# Patient Record
Sex: MALE | Race: WHITE | NOT HISPANIC OR LATINO
[De-identification: names, ages, dates, MRNs, and addresses within clinical notes are randomized per-mention and may not be internally consistent; named-entity substitution may affect disease eponyms.]

---

## 2023-05-10 ENCOUNTER — NON-APPOINTMENT (OUTPATIENT)
Age: 88
End: 2023-05-10

## 2023-09-29 ENCOUNTER — INPATIENT (INPATIENT)
Facility: HOSPITAL | Age: 88
LOS: 4 days | Discharge: NURSING, REHAB OR LONG-TERM CARE | DRG: 177 | End: 2023-10-04
Attending: INTERNAL MEDICINE | Admitting: INTERNAL MEDICINE
Payer: MEDICARE

## 2023-09-29 VITALS
DIASTOLIC BLOOD PRESSURE: 60 MMHG | WEIGHT: 156.53 LBS | HEART RATE: 94 BPM | RESPIRATION RATE: 18 BRPM | OXYGEN SATURATION: 97 % | SYSTOLIC BLOOD PRESSURE: 143 MMHG

## 2023-09-29 DIAGNOSIS — R06.02 SHORTNESS OF BREATH: ICD-10-CM

## 2023-09-29 LAB
ALBUMIN SERPL ELPH-MCNC: 3 G/DL — LOW (ref 3.5–5)
ALP SERPL-CCNC: 77 U/L — SIGNIFICANT CHANGE UP (ref 40–120)
ALT FLD-CCNC: 26 U/L DA — SIGNIFICANT CHANGE UP (ref 10–60)
ANION GAP SERPL CALC-SCNC: 9 MMOL/L — SIGNIFICANT CHANGE UP (ref 5–17)
ANISOCYTOSIS BLD QL: SLIGHT — SIGNIFICANT CHANGE UP
APTT BLD: 38.6 SEC — HIGH (ref 24.5–35.6)
AST SERPL-CCNC: 37 U/L — SIGNIFICANT CHANGE UP (ref 10–40)
BASE EXCESS BLDV CALC-SCNC: -1 MMOL/L — SIGNIFICANT CHANGE UP
BASE EXCESS BLDV CALC-SCNC: -4.4 MMOL/L — SIGNIFICANT CHANGE UP
BASOPHILS # BLD AUTO: 0 K/UL — SIGNIFICANT CHANGE UP (ref 0–0.2)
BASOPHILS NFR BLD AUTO: 0 % — SIGNIFICANT CHANGE UP (ref 0–2)
BILIRUB SERPL-MCNC: 0.4 MG/DL — SIGNIFICANT CHANGE UP (ref 0.2–1.2)
BLOOD GAS COMMENTS, VENOUS: SIGNIFICANT CHANGE UP
BUN SERPL-MCNC: 23 MG/DL — HIGH (ref 7–18)
CA-I SERPL-SCNC: SIGNIFICANT CHANGE UP MMOL/L (ref 1.15–1.33)
CALCIUM SERPL-MCNC: 8.9 MG/DL — SIGNIFICANT CHANGE UP (ref 8.4–10.5)
CHLORIDE SERPL-SCNC: 106 MMOL/L — SIGNIFICANT CHANGE UP (ref 96–108)
CO2 SERPL-SCNC: 26 MMOL/L — SIGNIFICANT CHANGE UP (ref 22–31)
CREAT SERPL-MCNC: 1 MG/DL — SIGNIFICANT CHANGE UP (ref 0.5–1.3)
EGFR: 73 ML/MIN/1.73M2 — SIGNIFICANT CHANGE UP
ELLIPTOCYTES BLD QL SMEAR: SLIGHT — SIGNIFICANT CHANGE UP
EOSINOPHIL # BLD AUTO: 0 K/UL — SIGNIFICANT CHANGE UP (ref 0–0.5)
EOSINOPHIL NFR BLD AUTO: 0 % — SIGNIFICANT CHANGE UP (ref 0–6)
GAS PNL BLDV: 141 MMOL/L — SIGNIFICANT CHANGE UP (ref 136–145)
GAS PNL BLDV: SIGNIFICANT CHANGE UP
GIANT PLATELETS BLD QL SMEAR: PRESENT — SIGNIFICANT CHANGE UP
GLUCOSE SERPL-MCNC: 236 MG/DL — HIGH (ref 70–99)
HCO3 BLDV-SCNC: 23 MMOL/L — SIGNIFICANT CHANGE UP (ref 22–29)
HCO3 BLDV-SCNC: 27 MMOL/L — SIGNIFICANT CHANGE UP (ref 22–29)
HCT VFR BLD CALC: 30.9 % — LOW (ref 39–50)
HGB BLD-MCNC: 9.6 G/DL — LOW (ref 13–17)
HOROWITZ INDEX BLDV+IHG-RTO: 21 — SIGNIFICANT CHANGE UP
HPIV2 RNA SPEC QL NAA+PROBE: DETECTED
INR BLD: 1.08 RATIO — SIGNIFICANT CHANGE UP (ref 0.85–1.18)
LACTATE BLDV-MCNC: 3.9 MMOL/L — HIGH (ref 0.5–2)
LACTATE SERPL-SCNC: 3.7 MMOL/L — HIGH (ref 0.7–2)
LACTATE SERPL-SCNC: 7.3 MMOL/L — CRITICAL HIGH (ref 0.7–2)
LG PLATELETS BLD QL AUTO: SLIGHT — SIGNIFICANT CHANGE UP
LYMPHOCYTES # BLD AUTO: 0.57 K/UL — LOW (ref 1–3.3)
LYMPHOCYTES # BLD AUTO: 5 % — LOW (ref 13–44)
MACROCYTES BLD QL: SLIGHT — SIGNIFICANT CHANGE UP
MANUAL SMEAR VERIFICATION: SIGNIFICANT CHANGE UP
MCHC RBC-ENTMCNC: 31.1 GM/DL — LOW (ref 32–36)
MCHC RBC-ENTMCNC: 37.5 PG — HIGH (ref 27–34)
MCV RBC AUTO: 120.7 FL — HIGH (ref 80–100)
MONOCYTES # BLD AUTO: 1.14 K/UL — HIGH (ref 0–0.9)
MONOCYTES NFR BLD AUTO: 10 % — SIGNIFICANT CHANGE UP (ref 2–14)
MYELOCYTES NFR BLD: 2 % — HIGH (ref 0–0)
NEUTROPHILS # BLD AUTO: 9 K/UL — HIGH (ref 1.8–7.4)
NEUTROPHILS NFR BLD AUTO: 79 % — HIGH (ref 43–77)
NRBC # BLD: 0 /100 — SIGNIFICANT CHANGE UP (ref 0–0)
NT-PROBNP SERPL-SCNC: 273 PG/ML — SIGNIFICANT CHANGE UP (ref 0–450)
OVALOCYTES BLD QL SMEAR: SLIGHT — SIGNIFICANT CHANGE UP
PCO2 BLDV: 48 MMHG — SIGNIFICANT CHANGE UP (ref 42–55)
PCO2 BLDV: 57 MMHG — HIGH (ref 42–55)
PH BLDV: 7.28 — LOW (ref 7.32–7.43)
PH BLDV: 7.28 — LOW (ref 7.32–7.43)
PLAT MORPH BLD: NORMAL — SIGNIFICANT CHANGE UP
PLATELET # BLD AUTO: 121 K/UL — LOW (ref 150–400)
PLATELET COUNT - ESTIMATE: NORMAL — SIGNIFICANT CHANGE UP
PO2 BLDV: 29 MMHG — SIGNIFICANT CHANGE UP
PO2 BLDV: 51 MMHG — SIGNIFICANT CHANGE UP
POIKILOCYTOSIS BLD QL AUTO: SLIGHT — SIGNIFICANT CHANGE UP
POTASSIUM BLDV-SCNC: 4.2 MMOL/L — SIGNIFICANT CHANGE UP (ref 3.5–5.1)
POTASSIUM SERPL-MCNC: 3.8 MMOL/L — SIGNIFICANT CHANGE UP (ref 3.5–5.3)
POTASSIUM SERPL-SCNC: 3.8 MMOL/L — SIGNIFICANT CHANGE UP (ref 3.5–5.3)
PROT SERPL-MCNC: 7.9 G/DL — SIGNIFICANT CHANGE UP (ref 6–8.3)
PROTHROM AB SERPL-ACNC: 12.3 SEC — SIGNIFICANT CHANGE UP (ref 9.5–13)
RAPID RVP RESULT: DETECTED
RBC # BLD: 2.56 M/UL — LOW (ref 4.2–5.8)
RBC # FLD: 15.1 % — HIGH (ref 10.3–14.5)
RBC BLD AUTO: ABNORMAL
SAO2 % BLDV: 31.8 % — SIGNIFICANT CHANGE UP
SAO2 % BLDV: 72.7 % — SIGNIFICANT CHANGE UP
SARS-COV-2 RNA SPEC QL NAA+PROBE: SIGNIFICANT CHANGE UP
SCHISTOCYTES BLD QL AUTO: SLIGHT — SIGNIFICANT CHANGE UP
SODIUM SERPL-SCNC: 141 MMOL/L — SIGNIFICANT CHANGE UP (ref 135–145)
TROPONIN I, HIGH SENSITIVITY RESULT: 9.2 NG/L — SIGNIFICANT CHANGE UP
VARIANT LYMPHS # BLD: 4 % — SIGNIFICANT CHANGE UP (ref 0–6)
WBC # BLD: 11.39 K/UL — HIGH (ref 3.8–10.5)
WBC # FLD AUTO: 11.39 K/UL — HIGH (ref 3.8–10.5)

## 2023-09-29 PROCEDURE — 99285 EMERGENCY DEPT VISIT HI MDM: CPT

## 2023-09-29 PROCEDURE — 71045 X-RAY EXAM CHEST 1 VIEW: CPT | Mod: 26

## 2023-09-29 RX ORDER — MAGNESIUM SULFATE 500 MG/ML
1 VIAL (ML) INJECTION ONCE
Refills: 0 | Status: COMPLETED | OUTPATIENT
Start: 2023-09-29 | End: 2023-09-29

## 2023-09-29 RX ORDER — PIPERACILLIN AND TAZOBACTAM 4; .5 G/20ML; G/20ML
3.38 INJECTION, POWDER, LYOPHILIZED, FOR SOLUTION INTRAVENOUS ONCE
Refills: 0 | Status: COMPLETED | OUTPATIENT
Start: 2023-09-29 | End: 2023-09-29

## 2023-09-29 RX ORDER — IPRATROPIUM/ALBUTEROL SULFATE 18-103MCG
3 AEROSOL WITH ADAPTER (GRAM) INHALATION
Refills: 0 | Status: COMPLETED | OUTPATIENT
Start: 2023-09-29 | End: 2023-09-29

## 2023-09-29 RX ORDER — SODIUM CHLORIDE 9 MG/ML
1000 INJECTION INTRAMUSCULAR; INTRAVENOUS; SUBCUTANEOUS ONCE
Refills: 0 | Status: COMPLETED | OUTPATIENT
Start: 2023-09-29 | End: 2023-09-29

## 2023-09-29 RX ORDER — VANCOMYCIN HCL 1 G
1000 VIAL (EA) INTRAVENOUS ONCE
Refills: 0 | Status: COMPLETED | OUTPATIENT
Start: 2023-09-29 | End: 2023-09-29

## 2023-09-29 RX ADMIN — PIPERACILLIN AND TAZOBACTAM 200 GRAM(S): 4; .5 INJECTION, POWDER, LYOPHILIZED, FOR SOLUTION INTRAVENOUS at 16:40

## 2023-09-29 RX ADMIN — SODIUM CHLORIDE 1000 MILLILITER(S): 9 INJECTION INTRAMUSCULAR; INTRAVENOUS; SUBCUTANEOUS at 23:01

## 2023-09-29 RX ADMIN — Medication 3 MILLILITER(S): at 19:38

## 2023-09-29 RX ADMIN — SODIUM CHLORIDE 1000 MILLILITER(S): 9 INJECTION INTRAMUSCULAR; INTRAVENOUS; SUBCUTANEOUS at 20:27

## 2023-09-29 RX ADMIN — SODIUM CHLORIDE 1000 MILLILITER(S): 9 INJECTION INTRAMUSCULAR; INTRAVENOUS; SUBCUTANEOUS at 19:27

## 2023-09-29 RX ADMIN — Medication 3 MILLILITER(S): at 19:27

## 2023-09-29 RX ADMIN — Medication 3 MILLILITER(S): at 18:44

## 2023-09-29 RX ADMIN — Medication 250 MILLIGRAM(S): at 17:50

## 2023-09-29 RX ADMIN — Medication 100 GRAM(S): at 16:40

## 2023-09-29 NOTE — ED PROVIDER NOTE - CLINICAL SUMMARY MEDICAL DECISION MAKING FREE TEXT BOX
87-year-old male with past medical history of carotid artery stenosis, depression, high cholesterol presents with shortness of breath from acute rehab.  EMS states when they arrived at the scene patient appeared tachypneic to 30s patient was already on nonrebreather.  States nonrebreather was still started when they arrived.  Patient arrived after being on nonrebreather for about an hour.  As per EMS his respiratory status has significantly improved.  EMS also reported patient was unconscious when they got there and was able to say his name when they arrived in the ED.  Patient is not providing any history, not  answering any questions.  Spoke with Alex son who is listed as healthcare proxy in the paperwork at cell #[phone] -states few weeks ago patient was admitted at [place] for pneumonia.  Patient stated Vicon for 4 weeks and became very weak was unable to walk.  Patient was discharged to subacute rehab at that time.  States he saw the patient yesterday and he was answering all questions appropriately.  Did not have any complaints at that time. Son states he has critical carotid stenosis - and he develops distress if he is laid flat. States he will not be able to tolerate lying flat for CT and does not want him to get it. States this has been on going for years. He sleeps on a recliner at home.  Patient is chronically ill-appearing.  Not in any respiratory distress.  Equal breath sounds bilaterally.  No wheezing or rhonchi appreciated.  Patient is not following any commands. Does not answer questions. Pupils are equal and reactive to light.  Eyes open spontaneouly. Ddx include but not limited to pneumonia, UTI, sepsis. Pt might have been laid flat at subacute rehab that might have caused the distress as the son is explaining. Pt is downgraded to NC to be observed on it. 87-year-old male with past medical history of carotid artery stenosis, depression, high cholesterol presents with shortness of breath from acute rehab.  EMS states when they arrived at the scene patient appeared tachypneic to 30s patient was already on nonrebreather.  States nonrebreather was still started when they arrived.  Patient arrived after being on nonrebreather for about an hour.  As per EMS his respiratory status has significantly improved.  EMS also reported patient was unconscious when they got there and was able to say his name when they arrived in the ED.  Patient is not providing any history, not  answering any questions.  Spoke with Alex son who is listed as healthcare proxy in the paperwork at cell #[phone] -states few weeks ago patient was admitted at [place] for pneumonia.  Patient stated Vicon for 4 weeks and became very weak was unable to walk.  Patient was discharged to subacute rehab at that time.  States he saw the patient yesterday and he was answering all questions appropriately.  Did not have any complaints at that time. Son states he has critical carotid stenosis - and he develops distress if he is laid flat. States he will not be able to tolerate lying flat for CT and does not want him to get it. States this has been on going for years. He sleeps on a recliner at home.  Patient is chronically ill-appearing.  Not in any respiratory distress.  Equal breath sounds bilaterally.  No wheezing or rhonchi appreciated.  Patient is not following any commands. Does not answer questions. Pupils are equal and reactive to light.  Eyes open spontaneouly. Ddx include but not limited to pneumonia, UTI, sepsis. Pt might have been laid flat at subacute rehab that might have caused the distress as the son is explaining. Pt is downgraded to NC to be observed on it.  Patient is noted to have fluid in his airway that is suctioned with copious mucus.  Once patient is suctioned well his saturation improved now only requiring 2 L of nasal cannula.  Patient denies any pain at this time.  Son visited and states patient is at his baseline now.

## 2023-09-29 NOTE — ED PROVIDER NOTE - PROGRESS NOTE DETAILS
pt appears significantly improved since he was suctioned - abd soft non tender. Noted to have elevated lactate that is uptrending. plan to given another bolus and send a repeat lactate.

## 2023-09-30 DIAGNOSIS — Z29.9 ENCOUNTER FOR PROPHYLACTIC MEASURES, UNSPECIFIED: ICD-10-CM

## 2023-09-30 DIAGNOSIS — F32.9 MAJOR DEPRESSIVE DISORDER, SINGLE EPISODE, UNSPECIFIED: ICD-10-CM

## 2023-09-30 DIAGNOSIS — G93.41 METABOLIC ENCEPHALOPATHY: ICD-10-CM

## 2023-09-30 DIAGNOSIS — E87.20 ACIDOSIS, UNSPECIFIED: ICD-10-CM

## 2023-09-30 DIAGNOSIS — I65.29 OCCLUSION AND STENOSIS OF UNSPECIFIED CAROTID ARTERY: ICD-10-CM

## 2023-09-30 DIAGNOSIS — J69.0 PNEUMONITIS DUE TO INHALATION OF FOOD AND VOMIT: ICD-10-CM

## 2023-09-30 DIAGNOSIS — B34.8 OTHER VIRAL INFECTIONS OF UNSPECIFIED SITE: ICD-10-CM

## 2023-09-30 DIAGNOSIS — J96.01 ACUTE RESPIRATORY FAILURE WITH HYPOXIA: ICD-10-CM

## 2023-09-30 DIAGNOSIS — E78.5 HYPERLIPIDEMIA, UNSPECIFIED: ICD-10-CM

## 2023-09-30 DIAGNOSIS — I10 ESSENTIAL (PRIMARY) HYPERTENSION: ICD-10-CM

## 2023-09-30 LAB
ANION GAP SERPL CALC-SCNC: 11 MMOL/L — SIGNIFICANT CHANGE UP (ref 5–17)
ANION GAP SERPL CALC-SCNC: 4 MMOL/L — LOW (ref 5–17)
APPEARANCE UR: ABNORMAL
BACTERIA # UR AUTO: ABNORMAL /HPF
BASOPHILS # BLD AUTO: 0.07 K/UL — SIGNIFICANT CHANGE UP (ref 0–0.2)
BASOPHILS NFR BLD AUTO: 0.2 % — SIGNIFICANT CHANGE UP (ref 0–2)
BILIRUB UR-MCNC: NEGATIVE — SIGNIFICANT CHANGE UP
BUN SERPL-MCNC: 20 MG/DL — HIGH (ref 7–18)
BUN SERPL-MCNC: 23 MG/DL — HIGH (ref 7–18)
CALCIUM SERPL-MCNC: 7.9 MG/DL — LOW (ref 8.4–10.5)
CALCIUM SERPL-MCNC: 7.9 MG/DL — LOW (ref 8.4–10.5)
CHLORIDE SERPL-SCNC: 109 MMOL/L — HIGH (ref 96–108)
CHLORIDE SERPL-SCNC: 112 MMOL/L — HIGH (ref 96–108)
CHOLEST SERPL-MCNC: 142 MG/DL — SIGNIFICANT CHANGE UP
CO2 SERPL-SCNC: 23 MMOL/L — SIGNIFICANT CHANGE UP (ref 22–31)
CO2 SERPL-SCNC: 27 MMOL/L — SIGNIFICANT CHANGE UP (ref 22–31)
COLOR SPEC: YELLOW — SIGNIFICANT CHANGE UP
CREAT SERPL-MCNC: 0.76 MG/DL — SIGNIFICANT CHANGE UP (ref 0.5–1.3)
CREAT SERPL-MCNC: 1.01 MG/DL — SIGNIFICANT CHANGE UP (ref 0.5–1.3)
DIFF PNL FLD: ABNORMAL
EGFR: 72 ML/MIN/1.73M2 — SIGNIFICANT CHANGE UP
EGFR: 87 ML/MIN/1.73M2 — SIGNIFICANT CHANGE UP
EOSINOPHIL # BLD AUTO: 0 K/UL — SIGNIFICANT CHANGE UP (ref 0–0.5)
EOSINOPHIL NFR BLD AUTO: 0 % — SIGNIFICANT CHANGE UP (ref 0–6)
EPI CELLS # UR: PRESENT
GLUCOSE SERPL-MCNC: 101 MG/DL — HIGH (ref 70–99)
GLUCOSE SERPL-MCNC: 129 MG/DL — HIGH (ref 70–99)
GLUCOSE UR QL: 250 MG/DL
HCT VFR BLD CALC: 23.9 % — LOW (ref 39–50)
HCT VFR BLD CALC: 26 % — LOW (ref 39–50)
HDLC SERPL-MCNC: 67 MG/DL — SIGNIFICANT CHANGE UP
HGB BLD-MCNC: 7.4 G/DL — LOW (ref 13–17)
HGB BLD-MCNC: 8.1 G/DL — LOW (ref 13–17)
IMM GRANULOCYTES NFR BLD AUTO: 2.2 % — HIGH (ref 0–0.9)
KETONES UR-MCNC: ABNORMAL MG/DL
LACTATE SERPL-SCNC: 1.8 MMOL/L — SIGNIFICANT CHANGE UP (ref 0.7–2)
LACTATE SERPL-SCNC: 5.6 MMOL/L — CRITICAL HIGH (ref 0.7–2)
LACTATE SERPL-SCNC: 6.1 MMOL/L — CRITICAL HIGH (ref 0.7–2)
LEUKOCYTE ESTERASE UR-ACNC: ABNORMAL
LIPID PNL WITH DIRECT LDL SERPL: 67 MG/DL — SIGNIFICANT CHANGE UP
LYMPHOCYTES # BLD AUTO: 0.66 K/UL — LOW (ref 1–3.3)
LYMPHOCYTES # BLD AUTO: 1.7 % — LOW (ref 13–44)
MAGNESIUM SERPL-MCNC: 1.9 MG/DL — SIGNIFICANT CHANGE UP (ref 1.6–2.6)
MCHC RBC-ENTMCNC: 31 GM/DL — LOW (ref 32–36)
MCHC RBC-ENTMCNC: 31.2 GM/DL — LOW (ref 32–36)
MCHC RBC-ENTMCNC: 37.3 PG — HIGH (ref 27–34)
MCHC RBC-ENTMCNC: 37.8 PG — HIGH (ref 27–34)
MCV RBC AUTO: 119.8 FL — HIGH (ref 80–100)
MCV RBC AUTO: 121.9 FL — HIGH (ref 80–100)
MONOCYTES # BLD AUTO: 8.15 K/UL — HIGH (ref 0–0.9)
MONOCYTES NFR BLD AUTO: 20.7 % — HIGH (ref 2–14)
NEUTROPHILS # BLD AUTO: 29.65 K/UL — HIGH (ref 1.8–7.4)
NEUTROPHILS NFR BLD AUTO: 75.2 % — SIGNIFICANT CHANGE UP (ref 43–77)
NITRITE UR-MCNC: NEGATIVE — SIGNIFICANT CHANGE UP
NON HDL CHOLESTEROL: 75 MG/DL — SIGNIFICANT CHANGE UP
NRBC # BLD: 0 /100 WBCS — SIGNIFICANT CHANGE UP (ref 0–0)
NRBC # BLD: 0 /100 WBCS — SIGNIFICANT CHANGE UP (ref 0–0)
PH UR: 5 — SIGNIFICANT CHANGE UP (ref 5–8)
PHOSPHATE SERPL-MCNC: 2.9 MG/DL — SIGNIFICANT CHANGE UP (ref 2.5–4.5)
PLATELET # BLD AUTO: 104 K/UL — LOW (ref 150–400)
PLATELET # BLD AUTO: 92 K/UL — LOW (ref 150–400)
POTASSIUM SERPL-MCNC: 3.9 MMOL/L — SIGNIFICANT CHANGE UP (ref 3.5–5.3)
POTASSIUM SERPL-MCNC: 4 MMOL/L — SIGNIFICANT CHANGE UP (ref 3.5–5.3)
POTASSIUM SERPL-SCNC: 3.9 MMOL/L — SIGNIFICANT CHANGE UP (ref 3.5–5.3)
POTASSIUM SERPL-SCNC: 4 MMOL/L — SIGNIFICANT CHANGE UP (ref 3.5–5.3)
PROT UR-MCNC: 100 MG/DL
RBC # BLD: 1.96 M/UL — LOW (ref 4.2–5.8)
RBC # BLD: 2.17 M/UL — LOW (ref 4.2–5.8)
RBC # FLD: 15.3 % — HIGH (ref 10.3–14.5)
RBC # FLD: 15.7 % — HIGH (ref 10.3–14.5)
RBC CASTS # UR COMP ASSIST: ABNORMAL /HPF
SODIUM SERPL-SCNC: 143 MMOL/L — SIGNIFICANT CHANGE UP (ref 135–145)
SODIUM SERPL-SCNC: 143 MMOL/L — SIGNIFICANT CHANGE UP (ref 135–145)
SP GR SPEC: 1.02 — SIGNIFICANT CHANGE UP (ref 1–1.03)
TRIGL SERPL-MCNC: 42 MG/DL — SIGNIFICANT CHANGE UP
UROBILINOGEN FLD QL: 1 MG/DL — SIGNIFICANT CHANGE UP (ref 0.2–1)
WBC # BLD: 22.17 K/UL — HIGH (ref 3.8–10.5)
WBC # BLD: 39.39 K/UL — HIGH (ref 3.8–10.5)
WBC # FLD AUTO: 22.17 K/UL — HIGH (ref 3.8–10.5)
WBC # FLD AUTO: 39.39 K/UL — HIGH (ref 3.8–10.5)
WBC UR QL: 5 /HPF — SIGNIFICANT CHANGE UP (ref 0–5)

## 2023-09-30 RX ORDER — ONDANSETRON 8 MG/1
4 TABLET, FILM COATED ORAL EVERY 8 HOURS
Refills: 0 | Status: DISCONTINUED | OUTPATIENT
Start: 2023-09-30 | End: 2023-10-04

## 2023-09-30 RX ORDER — SODIUM CHLORIDE 9 MG/ML
1000 INJECTION, SOLUTION INTRAVENOUS
Refills: 0 | Status: DISCONTINUED | OUTPATIENT
Start: 2023-09-30 | End: 2023-09-30

## 2023-09-30 RX ORDER — ENOXAPARIN SODIUM 100 MG/ML
40 INJECTION SUBCUTANEOUS EVERY 24 HOURS
Refills: 0 | Status: DISCONTINUED | OUTPATIENT
Start: 2023-09-30 | End: 2023-10-01

## 2023-09-30 RX ORDER — NYSTATIN CREAM 100000 [USP'U]/G
1 CREAM TOPICAL THREE TIMES A DAY
Refills: 0 | Status: DISCONTINUED | OUTPATIENT
Start: 2023-09-30 | End: 2023-10-04

## 2023-09-30 RX ORDER — TAMSULOSIN HYDROCHLORIDE 0.4 MG/1
1 CAPSULE ORAL
Refills: 0 | DISCHARGE

## 2023-09-30 RX ORDER — TAMSULOSIN HYDROCHLORIDE 0.4 MG/1
0.4 CAPSULE ORAL AT BEDTIME
Refills: 0 | Status: DISCONTINUED | OUTPATIENT
Start: 2023-09-30 | End: 2023-10-04

## 2023-09-30 RX ORDER — PIPERACILLIN AND TAZOBACTAM 4; .5 G/20ML; G/20ML
3.38 INJECTION, POWDER, LYOPHILIZED, FOR SOLUTION INTRAVENOUS EVERY 8 HOURS
Refills: 0 | Status: DISCONTINUED | OUTPATIENT
Start: 2023-09-30 | End: 2023-10-04

## 2023-09-30 RX ORDER — AMLODIPINE BESYLATE AND BENAZEPRIL HYDROCHLORIDE 10; 20 MG/1; MG/1
1 CAPSULE ORAL
Refills: 0 | DISCHARGE

## 2023-09-30 RX ORDER — ASPIRIN/CALCIUM CARB/MAGNESIUM 324 MG
81 TABLET ORAL DAILY
Refills: 0 | Status: DISCONTINUED | OUTPATIENT
Start: 2023-09-30 | End: 2023-10-04

## 2023-09-30 RX ORDER — FLUOXETINE HCL 10 MG
10 CAPSULE ORAL DAILY
Refills: 0 | Status: DISCONTINUED | OUTPATIENT
Start: 2023-09-30 | End: 2023-10-04

## 2023-09-30 RX ORDER — FOLIC ACID 0.8 MG
1 TABLET ORAL
Refills: 0 | DISCHARGE

## 2023-09-30 RX ORDER — ACETAMINOPHEN 500 MG
650 TABLET ORAL EVERY 6 HOURS
Refills: 0 | Status: DISCONTINUED | OUTPATIENT
Start: 2023-09-30 | End: 2023-10-04

## 2023-09-30 RX ORDER — FOLIC ACID 0.8 MG
1 TABLET ORAL DAILY
Refills: 0 | Status: DISCONTINUED | OUTPATIENT
Start: 2023-09-30 | End: 2023-10-04

## 2023-09-30 RX ORDER — AMLODIPINE BESYLATE 2.5 MG/1
5 TABLET ORAL DAILY
Refills: 0 | Status: DISCONTINUED | OUTPATIENT
Start: 2023-09-30 | End: 2023-10-04

## 2023-09-30 RX ORDER — LISINOPRIL 2.5 MG/1
10 TABLET ORAL DAILY
Refills: 0 | Status: DISCONTINUED | OUTPATIENT
Start: 2023-09-30 | End: 2023-10-04

## 2023-09-30 RX ORDER — SODIUM CHLORIDE 9 MG/ML
1000 INJECTION INTRAMUSCULAR; INTRAVENOUS; SUBCUTANEOUS
Refills: 0 | Status: DISCONTINUED | OUTPATIENT
Start: 2023-09-30 | End: 2023-10-02

## 2023-09-30 RX ORDER — ASPIRIN/CALCIUM CARB/MAGNESIUM 324 MG
1 TABLET ORAL
Refills: 0 | DISCHARGE

## 2023-09-30 RX ADMIN — PIPERACILLIN AND TAZOBACTAM 25 GRAM(S): 4; .5 INJECTION, POWDER, LYOPHILIZED, FOR SOLUTION INTRAVENOUS at 13:22

## 2023-09-30 RX ADMIN — SODIUM CHLORIDE 100 MILLILITER(S): 9 INJECTION, SOLUTION INTRAVENOUS at 01:10

## 2023-09-30 RX ADMIN — SODIUM CHLORIDE 80 MILLILITER(S): 9 INJECTION INTRAMUSCULAR; INTRAVENOUS; SUBCUTANEOUS at 18:35

## 2023-09-30 RX ADMIN — Medication 1 MILLIGRAM(S): at 22:20

## 2023-09-30 RX ADMIN — TAMSULOSIN HYDROCHLORIDE 0.4 MILLIGRAM(S): 0.4 CAPSULE ORAL at 22:20

## 2023-09-30 RX ADMIN — PIPERACILLIN AND TAZOBACTAM 25 GRAM(S): 4; .5 INJECTION, POWDER, LYOPHILIZED, FOR SOLUTION INTRAVENOUS at 22:19

## 2023-09-30 RX ADMIN — ENOXAPARIN SODIUM 40 MILLIGRAM(S): 100 INJECTION SUBCUTANEOUS at 13:21

## 2023-09-30 NOTE — PHYSICAL THERAPY INITIAL EVALUATION ADULT - GENERAL OBSERVATIONS, REHAB EVAL
pt seen supine in bed with NC at 2.5L, IV line, denied any complaint of pain. pt was cooperative during the evaluation

## 2023-09-30 NOTE — PHYSICAL THERAPY INITIAL EVALUATION ADULT - PERTINENT HX OF CURRENT PROBLEM, REHAB EVAL
86 y/o Male w/ carotid artery stenosis, HTN, depression, HLD, hx TIA/CVA?, on home O2 2L NC. Pt admitted due to SOB and generalized weakness.

## 2023-09-30 NOTE — CONSULT NOTE ADULT - ASSESSMENT
Patient is a 87y old  Male who is coming from [facility], with carotid artery stenosis, HTN, depression, HLD, hx TIA/CVA?, on home O2 2L NC, now presents to the ER for evaluation of SOB. Pt is a very poor narrator, collateral obtained from chart review. As per chart review, EMS stated when they arrived at the scene patient appeared tachypneic to 30s, was already on nonrebreather, and unconscious, when pt arrived to ER he had been on NRB for approx 1 hr and was awake and able to say his name. ED spoke with the pt's son who is HCP at cell #[phone] -states few weeks ago patient was admitted at [facility] for pneumonia.  Patient reportedly started Vicon for 4 weeks and became very weak was unable to walk.  Patient was discharged to subacute rehab at that time. Son saw the patient day prior and he was answering all questions appropriately.  Did not have any complaints at that time. ON admission, he found to have no fever, but after patient was suctioned and found to have copious mucus and food chunks in airway, once he was suctioned, he was back on 2L NC which is his baseline. He has started on  Zosyn and the ID consult requested to assist with further evaluation and antibiotic management.    # Aspiration pneumonitis- s/p suctioned copious mucus and food chunks from airway in ER  # Parainfluenza viral pneumonia  # Leukocytosis    would recommend:    1. Follow up Blood cultures  2. Monitor WBC count  3. Please obtain Procalcitonin level   4. Continue Zosyn for now and Supportive care for Parainfluenza   5. Supplemental oxygenation and Bronchodilator as needed  6. Isolation as per Protocol     d/w covering NP, Nir    will follow the patient with you and make further recommendation based on the clinical course and Lab results  Thank you for the opportunity to participate in Mr. DELA CRUZ's care    Attending Attestation:    Spent more than 65 minutes on total encounter, more than 50 % of the visit was spent counseling and/or coordinating care by the Attending physician.

## 2023-09-30 NOTE — PHYSICAL THERAPY INITIAL EVALUATION ADULT - DIAGNOSIS, PT EVAL
(ICF Model) Pt. present w/deficits in Body Structures/Function (Impairments), incl: Strength, Balance, coordination leading to deficits in performing the below noted Activities: bed mobility, transfers and ambulation.

## 2023-09-30 NOTE — H&P ADULT - NSHPPHYSICALEXAM_GEN_ALL_CORE
T(C): 36.2 (09-29-23 @ 16:45), Max: 36.2 (09-29-23 @ 16:45)  HR: 84 (09-29-23 @ 19:45) (84 - 94)  BP: 143/60 (09-29-23 @ 16:04) (143/60 - 143/60)  RR: 18 (09-29-23 @ 16:04) (18 - 18)  SpO2: 97% (09-29-23 @ 16:04) (97% - 97%)    CONSTITUTIONAL: appears disheveled, no apparent distress, chronically ill appearing, temporal wasting    HEENT: normotramuatic, acephalic, PERRL and symmetric, EOMI, No conjunctival or scleral injection, non-icteric. Oral mucosa with moist membranes. No external nasal lesions; poor dentition               Neck: supple, symmetric and without tracheal deviation    RESPIRATORY: No respiratory distress, no use of accessory muscles; CTA b/l, no wheezes, rales or rhonchi, poor air mvmt    CARDIOVASCULAR: RRRR, +S1S2, no murmurs, no rubs, no gallops; no JVD; no peripheral edema  	Vascular: carotid pulse palpable, radial pulse palpable    GASTROINTESTINAL: +BS, Soft, non tender, non distended, no rebound, no guarding; No palpable masses    MUSCULOSKELETAL: No digital clubbing or cyanosis; examination of the (head/neck, spine/ribs/pelvis, RUE, LUE, RLE, LLE) without misalignment, no spinal tenderness    SKIN: No rashes or ulcers noted; no subcutaneous nodules or induration palpable, scabs on legs, legs shiny and hairless    NEUROLOGIC: sensation intact in upper and lower extremities b/l to light touch    PSYCHIATRIC: AOx1 to person only    LYMPHATIC: No cervical LAD or tenderness    GENITOURINARY: +suprapubic tenderness, no CVA tenderness

## 2023-09-30 NOTE — CONSULT NOTE ADULT - SUBJECTIVE AND OBJECTIVE BOX
Time of visit:    CHIEF COMPLAINT: Patient is a 87y old  Male who presents with a chief complaint of acute hypoxic respiratory failure (30 Sep 2023 14:46)      HPI:  This is an  87-yo man , coming from [facility], w carotid artery stenosis, HTN, depression, HLD, hx TIA/CVA?, chronic hypoxic resp failure requiring supp O2 @  2L NC, presents w SOB. Pt is a very poor narrator, collateral obtained from chart review. As per chart review, EMS stated when they arrived at the scene patient appeared tachypneic to 30s patient was already on nonrebreather, and unconscious, when pt arrived to ED he had been on NRB for approx 1 hr and was awake and able to say his name. ED spoke with the pt's son who is HCP at cell #[phone] -states few weeks ago patient was admitted at [facility] for pneumonia.  Patient reportedly started Vicon for 4 weeks and became very weak was unable to walk.  Patient was discharged to subacute rehab at that time. States he saw the patient yesterday and he was answering all questions appropriately.  Did not have any complaints at that time. Of note, son states pt develops distress if he is laid flat. States he will not be able to tolerate lying flat for CT and does not want him to get it. States this has been on going for years. Pt sleeps on a recliner at home. In the ED, pt was suctioned and found to have copious mucus and food chunks in airway, once he was suctioned, he was back on 2L NC which is his baseline.     On interview pt was able to provide very little information, denied pain, discomfort, dyspnea, dysuria.    (30 Sep 2023 00:50)   Patient seen and examined.     PAST MEDICAL & SURGICAL HISTORY:  High cholesterol      Depression      Carotid artery stenosis      CVA (cerebrovascular accident)      History of TIAs          Allergies    No Known Allergies    Intolerances        MEDICATIONS  (STANDING):  amLODIPine   Tablet 5 milliGRAM(s) Oral daily  aspirin enteric coated 81 milliGRAM(s) Oral daily  enoxaparin Injectable 40 milliGRAM(s) SubCutaneous every 24 hours  FLUoxetine 10 milliGRAM(s) Oral daily  folic acid 1 milliGRAM(s) Oral daily  lactated ringers. 1000 milliLiter(s) (100 mL/Hr) IV Continuous <Continuous>  lisinopril 10 milliGRAM(s) Oral daily  LORazepam     Tablet 1 milliGRAM(s) Oral at bedtime  multivitamin 1 Tablet(s) Oral daily  piperacillin/tazobactam IVPB.. 3.375 Gram(s) IV Intermittent every 8 hours  tamsulosin 0.4 milliGRAM(s) Oral at bedtime      MEDICATIONS  (PRN):  acetaminophen     Tablet .. 650 milliGRAM(s) Oral every 6 hours PRN Temp greater or equal to 38C (100.4F), Mild Pain (1 - 3)  guaiFENesin Oral Liquid (Sugar-Free) 100 milliGRAM(s) Oral every 6 hours PRN Cough  ondansetron Injectable 4 milliGRAM(s) IV Push every 8 hours PRN Nausea and/or Vomiting   Medications up to date at time of exam.    Medications up to date at time of exam.    FAMILY HISTORY:      SOCIAL HISTORY   Smoking History: [   ] smoking/smoke exposure, [   ] former smoker  Living Condition: [   ] apartment, [   ] private house  Work History:   Travel History: denies recent travel  Illicit Substance Use: denies  Alcohol Use: denies    REVIEW OF SYSTEMS:    CONSTITUTIONAL:  denies fevers, chills, sweats, weight loss    HEENT:  denies diplopia or blurred vision, sore throat or runny nose.    CARDIOVASCULAR:  denies pressure, squeezing, tightness, or heaviness about the chest; no palpitations.    RESPIRATORY:  denies SOB, cough, GAYTAN, wheezing.    GASTROINTESTINAL:  denies abdominal pain, nausea, vomiting or diarrhea.    GENITOURINARY: denies dysuria, frequency or urgency.    NEUROLOGIC:  denies numbness, tingling, seizures or weakness.    PSYCHIATRIC:  denies disorder of thought or mood.    MSK: denies swelling, redness      PHYSICAL EXAMINATION:    GENERAL: The patient is a well-developed, well-nourished, in no apparent distress.     Vital Signs Last 24 Hrs  T(C): 37.1 (30 Sep 2023 13:40), Max: 37.1 (30 Sep 2023 13:40)  T(F): 98.8 (30 Sep 2023 13:40), Max: 98.8 (30 Sep 2023 13:40)  HR: 85 (30 Sep 2023 14:05) (80 - 99)  BP: 143/67 (30 Sep 2023 14:05) (123/54 - 143/67)  BP(mean): --  RR: 20 (30 Sep 2023 13:40) (18 - 22)  SpO2: 99% (30 Sep 2023 14:05) (98% - 100%)    Parameters below as of 30 Sep 2023 14:05  Patient On (Oxygen Delivery Method): nasal cannula  O2 Flow (L/min): 2.5     (if applicable)    Chest Tube (if applicable)    HEENT: Head is normocephalic and atraumatic. Extraocular muscles are intact. Mucous membranes are moist.     NECK: Supple, no palpable adenopathy.    LUNGS: Clear to auscultation, no wheezing, rales, or rhonchi.    HEART: Regular rate and rhythm without murmur.    ABDOMEN: Soft, nontender, and nondistended.  No hepatosplenomegaly is noted.    RENAL: No difficulty voiding, no pelvic pain    EXTREMITIES: Without any cyanosis, clubbing, rash, lesions or edema.    NEUROLOGIC: Awake, alert, oriented, grossly intact    SKIN: Warm, dry, good turgor.      LABS:                        8.1    39.39 )-----------( 104      ( 30 Sep 2023 04:50 )             26.0     09-30    143  |  109<H>  |  20<H>  ----------------------------<  129<H>  4.0   |  23  |  1.01    Ca    7.9<L>      30 Sep 2023 04:50  Phos  2.9     09-30  Mg     1.9     09-30    TPro  7.9  /  Alb  3.0<L>  /  TBili  0.4  /  DBili  x   /  AST  37  /  ALT  26  /  AlkPhos  77  09-29    PT/INR - ( 29 Sep 2023 16:15 )   PT: 12.3 sec;   INR: 1.08 ratio         PTT - ( 29 Sep 2023 16:15 )  PTT:38.6 sec  Urinalysis Basic - ( 30 Sep 2023 04:50 )    Color: x / Appearance: x / SG: x / pH: x  Gluc: 129 mg/dL / Ketone: x  / Bili: x / Urobili: x   Blood: x / Protein: x / Nitrite: x   Leuk Esterase: x / RBC: x / WBC x   Sq Epi: x / Non Sq Epi: x / Bacteria: x                Lactate, Blood: 5.6 mmol/L (09-30-23 @ 04:10)  Lactate, Blood: 6.1 mmol/L (09-30-23 @ 01:05)  Lactate, Blood: 7.3 mmol/L (09-29-23 @ 21:48)        MICROBIOLOGY: (if applicable)    RADIOLOGY & ADDITIONAL STUDIES:  EKG:   CXR:  ECHO:    IMPRESSION: 87y Male PAST MEDICAL & SURGICAL HISTORY:  High cholesterol      Depression      Carotid artery stenosis      CVA (cerebrovascular accident)      History of TIAs       p/w                   RECOMMENDATIONS:   Time of visit:    CHIEF COMPLAINT: Patient is a 87y old  Male who presents with a chief complaint of acute hypoxic respiratory failure (30 Sep 2023 14:46)      HPI:  This is an  87-yo man , coming from [facility], w carotid artery stenosis, HTN, depression, HLD, hx TIA/CVA?, chronic hypoxic resp failure requiring supp O2 @  2L NC, presents w SOB. Pt is a very poor narrator, collateral obtained from chart review. As per chart review, EMS stated when they arrived at the scene patient appeared tachypneic to 30s patient was already on nonrebreather, and unconscious, when pt arrived to ED he had been on NRB for approx 1 hr and was awake and able to say his name. ED spoke with the pt's son who is HCP at cell #[phone] -states few weeks ago patient was admitted at [facility] for pneumonia.  Patient reportedly started Vicon for 4 weeks and became very weak was unable to walk.  Patient was discharged to subacute rehab at that time. States he saw the patient yesterday and he was answering all questions appropriately.  Did not have any complaints at that time. Of note, son states pt develops distress if he is laid flat. States he will not be able to tolerate lying flat for CT and does not want him to get it. States this has been on going for years. Pt sleeps on a recliner at home. In the ED, pt was suctioned and found to have copious mucus and food chunks in airway, once he was suctioned, he was back on 2L NC which is his baseline.     On interview pt was able to provide very little information, denied pain, discomfort, dyspnea, dysuria.    (30 Sep 2023 00:50)   Patient seen and examined.     PAST MEDICAL & SURGICAL HISTORY:  High cholesterol      Depression      Carotid artery stenosis      CVA (cerebrovascular accident)      History of TIAs          Allergies    No Known Allergies    Intolerances        MEDICATIONS  (STANDING):  amLODIPine   Tablet 5 milliGRAM(s) Oral daily  aspirin enteric coated 81 milliGRAM(s) Oral daily  enoxaparin Injectable 40 milliGRAM(s) SubCutaneous every 24 hours  FLUoxetine 10 milliGRAM(s) Oral daily  folic acid 1 milliGRAM(s) Oral daily  lactated ringers. 1000 milliLiter(s) (100 mL/Hr) IV Continuous <Continuous>  lisinopril 10 milliGRAM(s) Oral daily  LORazepam     Tablet 1 milliGRAM(s) Oral at bedtime  multivitamin 1 Tablet(s) Oral daily  piperacillin/tazobactam IVPB.. 3.375 Gram(s) IV Intermittent every 8 hours  tamsulosin 0.4 milliGRAM(s) Oral at bedtime      MEDICATIONS  (PRN):  acetaminophen     Tablet .. 650 milliGRAM(s) Oral every 6 hours PRN Temp greater or equal to 38C (100.4F), Mild Pain (1 - 3)  guaiFENesin Oral Liquid (Sugar-Free) 100 milliGRAM(s) Oral every 6 hours PRN Cough  ondansetron Injectable 4 milliGRAM(s) IV Push every 8 hours PRN Nausea and/or Vomiting   Medications up to date at time of exam.    Medications up to date at time of exam.    FAMILY HISTORY:      SOCIAL HISTORY   Smoking History: [ x  ]  none smoking/smoke exposure, [   ] former smoker  Living Condition: [   ] apartment, [   ] private house  Work History: retired     Travel History: denies recent travel  Illicit Substance Use: denies  Alcohol Use: denies    REVIEW OF SYSTEMS: pat is unsure of answers     CONSTITUTIONAL:  denies fevers, chills, sweats, weight loss    HEENT:  denies diplopia or blurred vision, sore throat or runny nose.    CARDIOVASCULAR:  denies pressure, squeezing, tightness, or heaviness about the chest; no palpitations.    RESPIRATORY:  denies SOB, cough, GAYTAN, wheezing.    GASTROINTESTINAL:  denies abdominal pain, nausea, vomiting or diarrhea.    GENITOURINARY: denies dysuria, frequency or urgency.    NEUROLOGIC:  denies numbness, tingling, seizures or weakness.    PSYCHIATRIC:  denies disorder of thought or mood.    MSK: denies swelling, redness      PHYSICAL EXAMINATION:    GENERAL: The patient is a well-developed, well-nourished, in no apparent distress.     Vital Signs Last 24 Hrs  T(C): 37.1 (30 Sep 2023 13:40), Max: 37.1 (30 Sep 2023 13:40)  T(F): 98.8 (30 Sep 2023 13:40), Max: 98.8 (30 Sep 2023 13:40)  HR: 85 (30 Sep 2023 14:05) (80 - 99)  BP: 143/67 (30 Sep 2023 14:05) (123/54 - 143/67)  BP(mean): --  RR: 20 (30 Sep 2023 13:40) (18 - 22)  SpO2: 99% (30 Sep 2023 14:05) (98% - 100%)    Parameters below as of 30 Sep 2023 14:05  Patient On (Oxygen Delivery Method): nasal cannula  O2 Flow (L/min): 2.5     (if applicable)    Chest Tube (if applicable)    HEENT: Head is normocephalic and atraumatic. Extraocular muscles are intact. Mucous membranes are moist.     NECK: Supple, no palpable adenopathy.    LUNGS: Clear to auscultation, no wheezing, rales, or rhonchi.    HEART: Regular rate and rhythm without murmur.    ABDOMEN: Soft, nontender, and nondistended.  No hepatosplenomegaly is noted.    RENAL: No difficulty voiding, no pelvic pain    EXTREMITIES: Without any cyanosis, clubbing, rash, lesions or edema.    NEUROLOGIC: Awake, and respond to simple questions     SKIN: Warm, dry, good turgor.      LABS:                        8.1    39.39 )-----------( 104      ( 30 Sep 2023 04:50 )             26.0     09-30    143  |  109<H>  |  20<H>  ----------------------------<  129<H>  4.0   |  23  |  1.01    Ca    7.9<L>      30 Sep 2023 04:50  Phos  2.9     09-30  Mg     1.9     09-30    TPro  7.9  /  Alb  3.0<L>  /  TBili  0.4  /  DBili  x   /  AST  37  /  ALT  26  /  AlkPhos  77  09-29    PT/INR - ( 29 Sep 2023 16:15 )   PT: 12.3 sec;   INR: 1.08 ratio         PTT - ( 29 Sep 2023 16:15 )  PTT:38.6 sec  Urinalysis Basic - ( 30 Sep 2023 04:50 )    Color: x / Appearance: x / SG: x / pH: x  Gluc: 129 mg/dL / Ketone: x  / Bili: x / Urobili: x   Blood: x / Protein: x / Nitrite: x   Leuk Esterase: x / RBC: x / WBC x   Sq Epi: x / Non Sq Epi: x / Bacteria: x                Lactate, Blood: 5.6 mmol/L (09-30-23 @ 04:10)  Lactate, Blood: 6.1 mmol/L (09-30-23 @ 01:05)  Lactate, Blood: 7.3 mmol/L (09-29-23 @ 21:48)        MICROBIOLOGY: (if applicable)    RADIOLOGY & ADDITIONAL STUDIES:  EKG:   CXR:< from: Xray Chest 1 View- PORTABLE-Urgent (Xray Chest 1 View- PORTABLE-Urgent .) (09.29.23 @ 16:24) >    ACC: [id number] EXAM:  XR CHEST PORTABLE URGENT 1V   ORDERED BY: ROX MELENDREZ     PROCEDURE DATE:  09/29/2023          INTERPRETATION:  Chest portable    CLINICAL HISTORY: Evaluate for pneumonia    Comparison:  4/2/2014    Projection limits evaluation of the heart size. Mediastinal configuration   grossly unremarkable. Elevation of the right diaphragm with adjacent   platelike atelectasis/fibrosis. Lungs reveal patchy left   retrocardiac/left basilar infiltrates. Angles reveal minimal blunting   perhaps trace effusion. Bones without acute abnormality.    IMPRESSION: Left lower lobe infiltrate/atelectasis/effusion for which   follow-up radiographs are suggested.    --- End of Report ---            DOUGLAS SORIANO MD; Attending Radiologist  This document has been electronically signed. Sep 30 2023  9:59AM    < end of copied text >    Parainfluenza 2 (RapRVP): Detected (09.29.23 @ 16:15)      ECHO:    IMPRESSION: 87y Male PAST MEDICAL & SURGICAL HISTORY:  High cholesterol      Depression      Carotid artery stenosis      CVA (cerebrovascular accident)      History of TIAs       p/w           IMP: This is an  87-yr  man non smoker , coming from [facility], w carotid artery stenosis, HTN, depression, HLD, hx TIA/CVA?, chronic hypoxic resp failure requiring supp O2 @  2L NC, presents with  SOB and hypoxic resp failure .  Pt is a very poor historian , collateral obtained from chart review. As per chart review, EMS stated when they arrived at the scene patient appeared tachypneic to 30s patient was already on nonrebreather, and unconscious, when pt arrived to ED he had been on NRB for approx 1 hr and was awake and able to say his name. In the ED, pt was suctioned and found to have copious mucus and food chunks in airway, once he was suctioned, he was back on 2L NC which is his baseline.  Pat is on droplet isolation for parainfluenza infection . Lactate elevated s/p 2 L IVF      ASSESSMENT     - Acute on chronic hypoxic resp failure   - Asp PNA , UTI   - Parainfluenza infection   - CVA vs TIA   - HTN HLD   - Carotid stenosis   - Elevated lactate     Sugg  - Continue O2 supp as needed to maintain sat >90%   - Agreed with Zosyn   - F/U cultures   - Duonebs q6h   - Asp precaution   - S/S eval   - Repeat lactate , if still elevated , ICU eval   - Isolation: droplet   - DVT GI prophy   - Son refused CT chest as per EMR       spoke with Diego IBARRA and message sent to Dr Hernandez

## 2023-09-30 NOTE — H&P ADULT - HISTORY OF PRESENT ILLNESS
Pt is an 87-yo M, coming from [facility], w carotid artery stenosis, HTN, depression, HLD, hx TIA/CVA?, on home O2 2L NC, presents w SOB. Pt is a very poor narrator, collateral obtained from chart review. As per chart review, EMS stated when they arrived at the scene patient appeared tachypneic to 30s patient was already on nonrebreather, and unconscious, when pt arrived to ED he had been on NRB for approx 1 hr and was awake and able to say his name. ED spoke with the pt's son who is HCP at cell #[phone] -states few weeks ago patient was admitted at [facility] for pneumonia.  Patient reportedly started Vicon for 4 weeks and became very weak was unable to walk.  Patient was discharged to subacute rehab at that time. States he saw the patient yesterday and he was answering all questions appropriately.  Did not have any complaints at that time. Of note, son states pt develops distress if he is laid flat. States he will not be able to tolerate lying flat for CT and does not want him to get it. States this has been on going for years. Pt sleeps on a recliner at home. In the ED, pt was suctioned and found to have copious mucus and food chunks in airway, once he was suctioned, he was back on 2L NC which is his baseline.     On interview pt was able to provide very little information, denied pain, discomfort, dyspnea, dysuria.

## 2023-09-30 NOTE — PATIENT PROFILE ADULT - FALL HARM RISK - HARM RISK INTERVENTIONS
Assistance with ambulation/Assistance OOB with selected safe patient handling equipment/Communicate Risk of Fall with Harm to all staff/Monitor for mental status changes/Monitor gait and stability/Reinforce activity limits and safety measures with patient and family/Reorient to person, place and time as needed/Review medications for side effects contributing to fall risk/Sit up slowly, dangle for a short time, stand at bedside before walking/Tailored Fall Risk Interventions/Use of alarms - bed, chair and/or voice tab/Visual Cue: Yellow wristband and red socks/Bed in lowest position, wheels locked, appropriate side rails in place/Call bell, personal items and telephone in reach/Instruct patient to call for assistance before getting out of bed or chair/Non-slip footwear when patient is out of bed/[place] to call system/Physically safe environment - no spills, clutter or unnecessary equipment/Purposeful Proactive Rounding/Room/bathroom lighting operational, light cord in reach

## 2023-09-30 NOTE — PHYSICAL THERAPY INITIAL EVALUATION ADULT - IMPAIRMENTS FOUND, PT EVAL
aerobic capacity/endurance/cognitive impairment/gait, locomotion, and balance/muscle strength/poor safety awareness/ROM

## 2023-09-30 NOTE — H&P ADULT - PROBLEM SELECTOR PLAN 1
On admission was unconscious and req NRB, after aggressive suctioning which removed food chunks and copious secretions  2/2 aspiration, and pt had recently been admitted to [facility] for aspiration pneumonia  CXR wet read - No conslidations  s/p vanc + zosyn in ED    -given prior hx of aspiration pneumonia, very likely that pt will continue to have aspiration events  -elevate head of bed to 60-90 deg, do not lay patient flat  -NPO except meds pending s/s eval  -zosyn for now given hx of recent abx use, hospitalization, and stay in [facility]  -f/u bcx, ucx  -ID  _____ On admission was unconscious and req NRB, after aggressive suctioning which removed food chunks and copious secretions  2/2 aspiration, and pt had recently been admitted to [facility] for aspiration pneumonia  CXR wet read - No consolidations  Not meeting sepsis criteria  s/p vanc + zosyn in ED    -given prior hx of aspiration pneumonia, very likely that pt will continue to have aspiration events  -elevate head of bed to 60-90 deg, do not lay patient flat  -NPO except meds pending s/s eval  -zosyn for now given hx of recent abx use, hospitalization, and stay in [facility]  -f/u bcx, ucx  -ID  _____ On admission was unconscious and req NRB, after aggressive suctioning which removed food chunks and copious secretions  2/2 aspiration, and pt had recently been admitted to [facility] for aspiration pneumonia  CXR wet read - No consolidations  Not meeting sepsis criteria  s/p vanc + zosyn in ED    -given prior hx of aspiration pneumonia, very likely that pt will continue to have aspiration events  -elevate head of bed to 60-90 deg, do not lay patient flat  -NPO except meds pending s/s eval  -zosyn for now given hx of recent abx use, hospitalization, and stay in [facility]  -f/u bcx, ucx  -ID Dr. Zaman

## 2023-09-30 NOTE — H&P ADULT - PROBLEM SELECTOR PLAN 2
lactate 3.7 >> 7.3>>6.1  s/p 2L NS bolus in ED  1L LR at 100 cc/hr    -trend lactate, hydrate aggressively

## 2023-09-30 NOTE — CONSULT NOTE ADULT - SUBJECTIVE AND OBJECTIVE BOX
Patient is a 87y old  Male who is coming from [facility], with carotid artery stenosis, HTN, depression, HLD, hx TIA/CVA?, on home O2 2L NC, now presents to the ER for evaluation of SOB. Pt is a very poor narrator, collateral obtained from chart review. As per chart review, EMS stated when they arrived at the scene patient appeared tachypneic to 30s, was already on nonrebreather, and unconscious, when pt arrived to ER he had been on NRB for approx 1 hr and was awake and able to say his name. ED spoke with the pt's son who is HCP at cell #[phone] -states few weeks ago patient was admitted at [facility] for pneumonia.  Patient reportedly started Vicon for 4 weeks and became very weak was unable to walk.  Patient was discharged to subacute rehab at that time. Son saw the patient day prior and he was answering all questions appropriately.  Did not have any complaints at that time. ON admission, he found to have no fever, but after patient was suctioned and found to have copious mucus and food chunks in airway, once he was suctioned, he was back on 2L NC which is his baseline.         REVIEW OF SYSTEMS: Total of twelve systems have been reviewed with patient and found to be negative unless mentioned in HPI        PAST MEDICAL & SURGICAL HISTORY:  High cholesterol  Depression  Carotid artery stenosis  CVA (cerebrovascular accident)  History of TIAs        SOCIAL HISTORY  Alcohol: Does not drink  Tobacco: Does not smoke  Illicit substance use: None      FAMILY HISTORY: Non contributory to the present illness        ALLERGIES: No Known Allergies        Vital Signs Last 24 Hrs  T(C): 37.1 (30 Sep 2023 13:40), Max: 37.1 (30 Sep 2023 13:40)  T(F): 98.8 (30 Sep 2023 13:40), Max: 98.8 (30 Sep 2023 13:40)  HR: 82 (30 Sep 2023 13:40) (80 - 99)  BP: 131/56 (30 Sep 2023 13:40) (123/54 - 143/60)  BP(mean): --  RR: 20 (30 Sep 2023 13:40) (18 - 22)  SpO2: 98% (30 Sep 2023 13:40) (97% - 100%)    Parameters below as of 30 Sep 2023 13:40  Patient On (Oxygen Delivery Method): nasal cannula  O2 Flow (L/min): 3      PHYSICAL EXAM:  GENERAL: Not in distress   CHEST/LUNG:  Aire ntry bilaterally  HEART: s1 and s2 present  ABDOMEN:  Nontender and  Nondistended  EXTREMITIES: No pedal  edema  CNS: Awake and Alert      LABS:                        8.1    39.39 )-----------( 104      ( 30 Sep 2023 04:50 )             26.0       09-30    143  |  109<H>  |  20<H>  ----------------------------<  129<H>  4.0   |  23  |  1.01    Ca    7.9<L>      30 Sep 2023 04:50  Phos  2.9     09-30  Mg     1.9     09-30    TPro  7.9  /  Alb  3.0<L>  /  TBili  0.4  /  DBili  x   /  AST  37  /  ALT  26  /  AlkPhos  77  09-29    PT/INR - ( 29 Sep 2023 16:15 )   PT: 12.3 sec;   INR: 1.08 ratio    PTT - ( 29 Sep 2023 16:15 )  PTT:38.6 sec      CAPILLARY BLOOD GLUCOSE  POCT Blood Glucose.: 203 mg/dL (29 Sep 2023 16:03)        Urinalysis Basic - ( 30 Sep 2023 04:50 )  Color: x / Appearance: x / SG: x / pH: x  Gluc: 129 mg/dL / Ketone: x  / Bili: x / Urobili: x   Blood: x / Protein: x / Nitrite: x   Leuk Esterase: x / RBC: x / WBC x   Sq Epi: x / Non Sq Epi: x / Bacteria: x        MEDICATIONS  (STANDING):  amLODIPine   Tablet 5 milliGRAM(s) Oral daily  aspirin enteric coated 81 milliGRAM(s) Oral daily  enoxaparin Injectable 40 milliGRAM(s) SubCutaneous every 24 hours  FLUoxetine 10 milliGRAM(s) Oral daily  folic acid 1 milliGRAM(s) Oral daily  lactated ringers. 1000 milliLiter(s) (100 mL/Hr) IV Continuous <Continuous>  lisinopril 10 milliGRAM(s) Oral daily  LORazepam     Tablet 1 milliGRAM(s) Oral at bedtime  multivitamin 1 Tablet(s) Oral daily  piperacillin/tazobactam IVPB.. 3.375 Gram(s) IV Intermittent every 8 hours  tamsulosin 0.4 milliGRAM(s) Oral at bedtime    MEDICATIONS  (PRN):  acetaminophen     Tablet .. 650 milliGRAM(s) Oral every 6 hours PRN Temp greater or equal to 38C (100.4F), Mild Pain (1 - 3)  guaiFENesin Oral Liquid (Sugar-Free) 100 milliGRAM(s) Oral every 6 hours PRN Cough  ondansetron Injectable 4 milliGRAM(s) IV Push every 8 hours PRN Nausea and/or Vomiting        RADIOLOGY & ADDITIONAL TESTS:    9/29/23: Xray Chest 1 View- PORTABLE-Urgent (Xray Chest 1 View- PORTABLE-Urgent .) (09.29.23 @ 16:24) >    IMPRESSION: Left lower lobe infiltrate/atelectasis/effusion for which  follow-up radiographs are suggested.      MICROBIOLOGY DATA:    Respiratory Viral Panel with COVID-19 by RILEY (09.29.23 @ 16:15)   Rapid RVP Result: Detected  SARS-CoV-2: NotDetec: This              Patient is a 87y old  Male who is coming from [facility], with carotid artery stenosis, HTN, depression, HLD, hx TIA/CVA?, on home O2 2L NC, now presents to the ER for evaluation of SOB. Pt is a very poor narrator, collateral obtained from chart review. As per chart review, EMS stated when they arrived at the scene patient appeared tachypneic to 30s, was already on nonrebreather, and unconscious, when pt arrived to ER he had been on NRB for approx 1 hr and was awake and able to say his name. ED spoke with the pt's son who is HCP at cell #[phone] -states few weeks ago patient was admitted at [facility] for pneumonia.  Patient reportedly started Vicon for 4 weeks and became very weak was unable to walk.  Patient was discharged to subacute rehab at that time. Son saw the patient day prior and he was answering all questions appropriately.  Did not have any complaints at that time. ON admission, he found to have no fever, but after patient was suctioned and found to have copious mucus and food chunks in airway, once he was suctioned, he was back on 2L NC which is his baseline. He has started on  Zosyn and the ID consult requested to assist with further evaluation and antibiotic management.      REVIEW OF SYSTEMS: Total of twelve systems have been reviewed with patient and found to be negative unless mentioned in HPI      PAST MEDICAL & SURGICAL HISTORY:  High cholesterol  Depression  Carotid artery stenosis  CVA (cerebrovascular accident)  History of TIAs      SOCIAL HISTORY  Alcohol: Does not drink  Tobacco: Does not smoke  Illicit substance use: None      FAMILY HISTORY: Non contributory to the present illness      ALLERGIES: No Known Allergies      Vital Signs Last 24 Hrs  T(C): 37.1 (30 Sep 2023 13:40), Max: 37.1 (30 Sep 2023 13:40)  T(F): 98.8 (30 Sep 2023 13:40), Max: 98.8 (30 Sep 2023 13:40)  HR: 82 (30 Sep 2023 13:40) (80 - 99)  BP: 131/56 (30 Sep 2023 13:40) (123/54 - 143/60)  BP(mean): --  RR: 20 (30 Sep 2023 13:40) (18 - 22)  SpO2: 98% (30 Sep 2023 13:40) (97% - 100%)    Parameters below as of 30 Sep 2023 13:40  Patient On (Oxygen Delivery Method): nasal cannula  O2 Flow (L/min): 3      PHYSICAL EXAM:  GENERAL: Not in distress , on oxygen via NC  CHEST/LUNG: Not using accessory muscles  HEART: s1 and s2 present  ABDOMEN:  Nontender and  Nondistended  EXTREMITIES: No pedal  edema  CNS: Awake and Alert      LABS:                        8.1    39.39 )-----------( 104      ( 30 Sep 2023 04:50 )             26.0       09-30    143  |  109<H>  |  20<H>  ----------------------------<  129<H>  4.0   |  23  |  1.01    Ca    7.9<L>      30 Sep 2023 04:50  Phos  2.9     09-30  Mg     1.9     09-30    TPro  7.9  /  Alb  3.0<L>  /  TBili  0.4  /  DBili  x   /  AST  37  /  ALT  26  /  AlkPhos  77  09-29    PT/INR - ( 29 Sep 2023 16:15 )   PT: 12.3 sec;   INR: 1.08 ratio    PTT - ( 29 Sep 2023 16:15 )  PTT:38.6 sec      CAPILLARY BLOOD GLUCOSE  POCT Blood Glucose.: 203 mg/dL (29 Sep 2023 16:03)        Urinalysis Basic - ( 30 Sep 2023 04:50 )  Color: x / Appearance: x / SG: x / pH: x  Gluc: 129 mg/dL / Ketone: x  / Bili: x / Urobili: x   Blood: x / Protein: x / Nitrite: x   Leuk Esterase: x / RBC: x / WBC x   Sq Epi: x / Non Sq Epi: x / Bacteria: x        MEDICATIONS  (STANDING):  amLODIPine   Tablet 5 milliGRAM(s) Oral daily  aspirin enteric coated 81 milliGRAM(s) Oral daily  enoxaparin Injectable 40 milliGRAM(s) SubCutaneous every 24 hours  FLUoxetine 10 milliGRAM(s) Oral daily  folic acid 1 milliGRAM(s) Oral daily  lactated ringers. 1000 milliLiter(s) (100 mL/Hr) IV Continuous <Continuous>  lisinopril 10 milliGRAM(s) Oral daily  LORazepam     Tablet 1 milliGRAM(s) Oral at bedtime  multivitamin 1 Tablet(s) Oral daily  piperacillin/tazobactam IVPB.. 3.375 Gram(s) IV Intermittent every 8 hours  tamsulosin 0.4 milliGRAM(s) Oral at bedtime    MEDICATIONS  (PRN):  acetaminophen     Tablet .. 650 milliGRAM(s) Oral every 6 hours PRN Temp greater or equal to 38C (100.4F), Mild Pain (1 - 3)  guaiFENesin Oral Liquid (Sugar-Free) 100 milliGRAM(s) Oral every 6 hours PRN Cough  ondansetron Injectable 4 milliGRAM(s) IV Push every 8 hours PRN Nausea and/or Vomiting        RADIOLOGY & ADDITIONAL TESTS:    9/29/23: Xray Chest 1 View- PORTABLE-Urgent (Xray Chest 1 View- PORTABLE-Urgent .) (09.29.23 @ 16:24) >    IMPRESSION: Left lower lobe infiltrate/atelectasis/effusion for which  follow-up radiographs are suggested.      MICROBIOLOGY DATA:    Respiratory Viral Panel with COVID-19 by RILEY (09.29.23 @ 16:15)   Rapid RVP Result: Detected  SARS-CoV-2: NotDetec: This

## 2023-09-30 NOTE — PHYSICAL THERAPY INITIAL EVALUATION ADULT - LEVEL OF INDEPENDENCE: BED TO CHAIR, REHAB EVAL
due to weakness/unable to perform Abdomen soft, nontender, nondistended, bowel sounds present in all 4 quadrants.

## 2023-09-30 NOTE — H&P ADULT - PROBLEM SELECTOR PLAN 3
mentation appears to be improving, initially unconscious, then awake but not answering questions. On interview pt was oriented to only self but was able to answer simple questions    -likely 2/2 aspiration and parainfluenza

## 2023-09-30 NOTE — H&P ADULT - ATTENDING COMMENTS
discussed management plan with house staff  pt was evalauted by writer earlier during the day  pulm eval

## 2023-09-30 NOTE — H&P ADULT - NSICDXPASTMEDICALHX_GEN_ALL_CORE_FT
PAST MEDICAL HISTORY:  Carotid artery stenosis     CVA (cerebrovascular accident)     Depression     High cholesterol     History of TIAs

## 2023-09-30 NOTE — H&P ADULT - PROBLEM SELECTOR PLAN 7
unk if pt follows w vascular o/p  Family refused CT scan    -carotid doppler  -vascular consulted unk if pt follows w vascular o/p  Family refused CT scan    -carotid doppler

## 2023-09-30 NOTE — H&P ADULT - ASSESSMENT
87-yo M, coming from [facility], w carotid artery stenosis, HTN, depression, HLD, hx TIA/CVA?, on home O2 2L NC, admitted for aspiration pneumonia resulting in AHRF, lactic acidosis, and encephalopathy, also found to be parainfluenza pos.

## 2023-10-01 LAB
ABO RH CONFIRMATION: SIGNIFICANT CHANGE UP
ANION GAP SERPL CALC-SCNC: 4 MMOL/L — LOW (ref 5–17)
BASOPHILS # BLD AUTO: 0.03 K/UL — SIGNIFICANT CHANGE UP (ref 0–0.2)
BASOPHILS # BLD AUTO: 0.04 K/UL — SIGNIFICANT CHANGE UP (ref 0–0.2)
BASOPHILS NFR BLD AUTO: 0.3 % — SIGNIFICANT CHANGE UP (ref 0–2)
BASOPHILS NFR BLD AUTO: 0.4 % — SIGNIFICANT CHANGE UP (ref 0–2)
BLD GP AB SCN SERPL QL: SIGNIFICANT CHANGE UP
BUN SERPL-MCNC: 20 MG/DL — HIGH (ref 7–18)
CALCIUM SERPL-MCNC: 7.7 MG/DL — LOW (ref 8.4–10.5)
CHLORIDE SERPL-SCNC: 112 MMOL/L — HIGH (ref 96–108)
CO2 SERPL-SCNC: 26 MMOL/L — SIGNIFICANT CHANGE UP (ref 22–31)
CREAT SERPL-MCNC: 0.81 MG/DL — SIGNIFICANT CHANGE UP (ref 0.5–1.3)
CULTURE RESULTS: SIGNIFICANT CHANGE UP
EGFR: 85 ML/MIN/1.73M2 — SIGNIFICANT CHANGE UP
EOSINOPHIL # BLD AUTO: 0 K/UL — SIGNIFICANT CHANGE UP (ref 0–0.5)
EOSINOPHIL # BLD AUTO: 0.01 K/UL — SIGNIFICANT CHANGE UP (ref 0–0.5)
EOSINOPHIL NFR BLD AUTO: 0 % — SIGNIFICANT CHANGE UP (ref 0–6)
EOSINOPHIL NFR BLD AUTO: 0.1 % — SIGNIFICANT CHANGE UP (ref 0–6)
GLUCOSE SERPL-MCNC: 86 MG/DL — SIGNIFICANT CHANGE UP (ref 70–99)
HCT VFR BLD CALC: 22.8 % — LOW (ref 39–50)
HCT VFR BLD CALC: 23.1 % — LOW (ref 39–50)
HCT VFR BLD CALC: 25.5 % — LOW (ref 39–50)
HGB BLD-MCNC: 6.8 G/DL — CRITICAL LOW (ref 13–17)
HGB BLD-MCNC: 6.9 G/DL — CRITICAL LOW (ref 13–17)
HGB BLD-MCNC: 8.2 G/DL — LOW (ref 13–17)
IMM GRANULOCYTES NFR BLD AUTO: 1.5 % — HIGH (ref 0–0.9)
IMM GRANULOCYTES NFR BLD AUTO: 1.6 % — HIGH (ref 0–0.9)
LYMPHOCYTES # BLD AUTO: 0.93 K/UL — LOW (ref 1–3.3)
LYMPHOCYTES # BLD AUTO: 1.03 K/UL — SIGNIFICANT CHANGE UP (ref 1–3.3)
LYMPHOCYTES # BLD AUTO: 10 % — LOW (ref 13–44)
LYMPHOCYTES # BLD AUTO: 10.6 % — LOW (ref 13–44)
MAGNESIUM SERPL-MCNC: 2.2 MG/DL — SIGNIFICANT CHANGE UP (ref 1.6–2.6)
MCHC RBC-ENTMCNC: 29.4 GM/DL — LOW (ref 32–36)
MCHC RBC-ENTMCNC: 30.3 GM/DL — LOW (ref 32–36)
MCHC RBC-ENTMCNC: 32.2 GM/DL — SIGNIFICANT CHANGE UP (ref 32–36)
MCHC RBC-ENTMCNC: 35.7 PG — HIGH (ref 27–34)
MCHC RBC-ENTMCNC: 36.2 PG — HIGH (ref 27–34)
MCHC RBC-ENTMCNC: 37.1 PG — HIGH (ref 27–34)
MCV RBC AUTO: 110.9 FL — HIGH (ref 80–100)
MCV RBC AUTO: 122.6 FL — HIGH (ref 80–100)
MCV RBC AUTO: 122.9 FL — HIGH (ref 80–100)
MONOCYTES # BLD AUTO: 2.38 K/UL — HIGH (ref 0–0.9)
MONOCYTES # BLD AUTO: 2.72 K/UL — HIGH (ref 0–0.9)
MONOCYTES NFR BLD AUTO: 26.5 % — HIGH (ref 2–14)
MONOCYTES NFR BLD AUTO: 27.2 % — HIGH (ref 2–14)
NEUTROPHILS # BLD AUTO: 5.27 K/UL — SIGNIFICANT CHANGE UP (ref 1.8–7.4)
NEUTROPHILS # BLD AUTO: 6.32 K/UL — SIGNIFICANT CHANGE UP (ref 1.8–7.4)
NEUTROPHILS NFR BLD AUTO: 60.2 % — SIGNIFICANT CHANGE UP (ref 43–77)
NEUTROPHILS NFR BLD AUTO: 61.6 % — SIGNIFICANT CHANGE UP (ref 43–77)
NRBC # BLD: 0 /100 WBCS — SIGNIFICANT CHANGE UP (ref 0–0)
PHOSPHATE SERPL-MCNC: 2.4 MG/DL — LOW (ref 2.5–4.5)
PLATELET # BLD AUTO: 76 K/UL — LOW (ref 150–400)
PLATELET # BLD AUTO: 79 K/UL — LOW (ref 150–400)
PLATELET # BLD AUTO: 79 K/UL — LOW (ref 150–400)
POTASSIUM SERPL-MCNC: 3.8 MMOL/L — SIGNIFICANT CHANGE UP (ref 3.5–5.3)
POTASSIUM SERPL-SCNC: 3.8 MMOL/L — SIGNIFICANT CHANGE UP (ref 3.5–5.3)
PROCALCITONIN SERPL-MCNC: 1.43 NG/ML — HIGH (ref 0.02–0.1)
RBC # BLD: 1.86 M/UL — LOW (ref 4.2–5.8)
RBC # BLD: 1.88 M/UL — LOW (ref 4.2–5.8)
RBC # BLD: 2.3 M/UL — LOW (ref 4.2–5.8)
RBC # FLD: 15.7 % — HIGH (ref 10.3–14.5)
RBC # FLD: 15.7 % — HIGH (ref 10.3–14.5)
RBC # FLD: SIGNIFICANT CHANGE UP (ref 10.3–14.5)
SODIUM SERPL-SCNC: 142 MMOL/L — SIGNIFICANT CHANGE UP (ref 135–145)
SPECIMEN SOURCE: SIGNIFICANT CHANGE UP
WBC # BLD: 10.26 K/UL — SIGNIFICANT CHANGE UP (ref 3.8–10.5)
WBC # BLD: 7.08 K/UL — SIGNIFICANT CHANGE UP (ref 3.8–10.5)
WBC # BLD: 8.76 K/UL — SIGNIFICANT CHANGE UP (ref 3.8–10.5)
WBC # FLD AUTO: 10.26 K/UL — SIGNIFICANT CHANGE UP (ref 3.8–10.5)
WBC # FLD AUTO: 7.08 K/UL — SIGNIFICANT CHANGE UP (ref 3.8–10.5)
WBC # FLD AUTO: 8.76 K/UL — SIGNIFICANT CHANGE UP (ref 3.8–10.5)

## 2023-10-01 PROCEDURE — 70490 CT SOFT TISSUE NECK W/O DYE: CPT | Mod: 26

## 2023-10-01 PROCEDURE — 93880 EXTRACRANIAL BILAT STUDY: CPT | Mod: 26

## 2023-10-01 RX ADMIN — SODIUM CHLORIDE 80 MILLILITER(S): 9 INJECTION INTRAMUSCULAR; INTRAVENOUS; SUBCUTANEOUS at 19:43

## 2023-10-01 RX ADMIN — Medication 1 MILLIGRAM(S): at 12:48

## 2023-10-01 RX ADMIN — Medication 1 MILLIGRAM(S): at 23:14

## 2023-10-01 RX ADMIN — PIPERACILLIN AND TAZOBACTAM 25 GRAM(S): 4; .5 INJECTION, POWDER, LYOPHILIZED, FOR SOLUTION INTRAVENOUS at 05:11

## 2023-10-01 RX ADMIN — AMLODIPINE BESYLATE 5 MILLIGRAM(S): 2.5 TABLET ORAL at 05:06

## 2023-10-01 RX ADMIN — TAMSULOSIN HYDROCHLORIDE 0.4 MILLIGRAM(S): 0.4 CAPSULE ORAL at 23:16

## 2023-10-01 RX ADMIN — PIPERACILLIN AND TAZOBACTAM 25 GRAM(S): 4; .5 INJECTION, POWDER, LYOPHILIZED, FOR SOLUTION INTRAVENOUS at 23:15

## 2023-10-01 RX ADMIN — Medication 1 TABLET(S): at 12:48

## 2023-10-01 RX ADMIN — SODIUM CHLORIDE 80 MILLILITER(S): 9 INJECTION INTRAMUSCULAR; INTRAVENOUS; SUBCUTANEOUS at 15:11

## 2023-10-01 RX ADMIN — LISINOPRIL 10 MILLIGRAM(S): 2.5 TABLET ORAL at 05:06

## 2023-10-01 RX ADMIN — PIPERACILLIN AND TAZOBACTAM 25 GRAM(S): 4; .5 INJECTION, POWDER, LYOPHILIZED, FOR SOLUTION INTRAVENOUS at 15:11

## 2023-10-01 RX ADMIN — Medication 10 MILLIGRAM(S): at 15:11

## 2023-10-01 RX ADMIN — ENOXAPARIN SODIUM 40 MILLIGRAM(S): 100 INJECTION SUBCUTANEOUS at 15:11

## 2023-10-01 RX ADMIN — Medication 81 MILLIGRAM(S): at 12:49

## 2023-10-01 NOTE — PROGRESS NOTE ADULT - ASSESSMENT
87-yo M, coming from [facility], w carotid artery stenosis, HTN, depression, HLD, hx TIA/CVA?, on home O2 2L NC, admitted for aspiration pneumonia resulting in AHRF, lactic acidosis, and encephalopathy, also found to be parainfluenza pos.

## 2023-10-01 NOTE — SWALLOW BEDSIDE ASSESSMENT ADULT - SLP PERTINENT HISTORY OF CURRENT PROBLEM
Pt is an 87-yo M, coming from [facility], w carotid artery stenosis, HTN, depression, HLD, hx TIA/CVA?, on home O2 2L NC, presents w SOB. Pt is a very poor narrator, collateral obtained from chart review. As per chart review, EMS stated when they arrived at the scene patient appeared tachypneic to 30s patient was already on nonrebreather, and unconscious, when pt arrived to ED he had been on NRB for approx 1 hr and was awake and able to say his name. A few weeks ago patient was admitted at [facility] for pneumonia. Patient was discharged to subacute rehab at that time. Of note, son states pt develops distress if he is laid flat. Pt sleeps on a recliner at home. In the ED, pt was suctioned and found to have copious mucus and food chunks in airway, once he was suctioned, he was back on 2L NC which is his baseline.

## 2023-10-01 NOTE — CONSULT NOTE ADULT - ASSESSMENT
88 y/o male a/w aspiration pneumonia with carotid artery stenosis  VSS, afebrile, saturating well on NC    Plan   - no acute vascular intervention indication at this time  - continue ASA / Plavix   - pt should follow up as an outpatient with [facility] vascular surgeon once d/c from hospital (appointment scheduled as per Son)   - pt may follow up with Dr. Martinez if unable to with [facility] surgeon   - remainder of care as per primary team   - discussed and agrees with attending   - re consult Alexis Pruitt   VASCULAR SURGERY  [address] [place] [address]  [phone]

## 2023-10-01 NOTE — PROGRESS NOTE ADULT - ASSESSMENT
Lm for patient to call office back to schedule dental appt Patient is a 87y old  Male who is coming from [facility], with carotid artery stenosis, HTN, depression, HLD, hx TIA/CVA?, on home O2 2L NC, now presents to the ER for evaluation of SOB. Pt is a very poor narrator, collateral obtained from chart review. As per chart review, EMS stated when they arrived at the scene patient appeared tachypneic to 30s, was already on nonrebreather, and unconscious, when pt arrived to ER he had been on NRB for approx 1 hr and was awake and able to say his name. ED spoke with the pt's son who is HCP at cell #[phone] -states few weeks ago patient was admitted at [facility] for pneumonia.  Patient reportedly started Vicon for 4 weeks and became very weak was unable to walk.  Patient was discharged to subacute rehab at that time. Son saw the patient day prior and he was answering all questions appropriately.  Did not have any complaints at that time. ON admission, he found to have no fever, but after patient was suctioned and found to have copious mucus and food chunks in airway, once he was suctioned, he was back on 2L NC which is his baseline. He has started on  Zosyn and the ID consult requested to assist with further evaluation and antibiotic management.    # Aspiration pneumonitis- s/p suctioned copious mucus and food chunks from airway in ER  # Parainfluenza viral pneumonia  # Leukocytosis- resolved  # Hematuria- dropped H/H and s/p Transfused 1 U PRBC    would recommend:    1. Monitor h/h and transfuse as needed, consider holding the Anticoagulation   2. Urology evaluation for hematuria  3. Continue Zosyn to treat superimposed abcterial pneumonia, since Procalcitonin level is elevated, 1.43  4. Continue Supportive care for Parainfluenza   5. Supplemental oxygenation and Bronchodilator as needed  6. Isolation as per Protocol     d/w covering Migdalia LY     Attending Attestation:    Spent more than 45 minutes on total encounter, more than 50 % of the visit was spent counseling and/or coordinating care by the Attending physician.

## 2023-10-01 NOTE — PROGRESS NOTE ADULT - PROBLEM SELECTOR PLAN 3
mentation appears to be improving, initially unconscious, then awake but not answering questions. On interview pt was oriented to only self but was able to answer simple questions    -likely 2/2 aspiration and parainfluenza  improving menial status

## 2023-10-01 NOTE — SWALLOW BEDSIDE ASSESSMENT ADULT - ORAL PHASE
Decreased anterior-posterior movement of the bolus Decreased anterior-posterior movement of the bolus/Lingual stasis

## 2023-10-01 NOTE — PROGRESS NOTE ADULT - PROBLEM SELECTOR PLAN 1
Aspiration pneumonitis- s/p suctioned copious mucus and food chunks from airway in ER  # Parainfluenza viral pneumonia  # Leukocytosis- resolved  # Hematuria- dropped H/H and s/p Transfused 1 U PRBC    would recommend:    1. Monitor h/h and transfuse as needed, consider holding the Anticoagulation   2. Urology evaluation for hematuria  3. Continue Zosyn to treat superimposed bacterial pneumonia, since Procalcitonin level is elevated, 1.43  4. Continue Supportive care for Parainfluenza   5. Supplemental oxygenation and Bronchodilator as needed  6. Isolation as per Protocol

## 2023-10-01 NOTE — SWALLOW BEDSIDE ASSESSMENT ADULT - ADDITIONAL RECOMMENDATIONS
- Safe swallow strategies: upright position during/after meals, no straw, feed slowly, small sips/bites, alternate solids/liquids, oral care.  - f/u to assess possible diet advancement, as schedule permits.  - Extensive education provided to son re: aspiration risk.

## 2023-10-01 NOTE — SWALLOW BEDSIDE ASSESSMENT ADULT - SWALLOW EVAL: DIAGNOSIS
Pt p/w s/s of oropharyngeal dysphagia c/b slow A-P transport, oral residue, suspected delayed initiation of pharyngeal swallow. Dry cough noticed throughout the session, does not seem to be related to swallow.

## 2023-10-01 NOTE — SWALLOW BEDSIDE ASSESSMENT ADULT - SWALLOW EVAL: PROGNOSIS
Joselo Davis  [facility]  [address] [place] [address]  Phone: [phone]  Fax: [phone]  Follow Up Time:     Shay Hoff)  Critical Care Medicine; Internal Medicine; Pulmonary Disease; Sleep Medicine  [address] [place] [address]  Phone: [phone]  Fax: [phone]  Follow Up Time:    [place]

## 2023-10-01 NOTE — SWALLOW BEDSIDE ASSESSMENT ADULT - CONSISTENCIES ADMINISTERED
ice chip, x1 water, ~1oz/thin liquid applesauce+laura crackers, x2 tsp/minced & moist applesauce, x2 tsp/pureed

## 2023-10-01 NOTE — CHART NOTE - NSCHARTNOTEFT_GEN_A_CORE
EVENT: Patient with hematuria and carotid stenosis. Patient denies headache, numbness, or change in mental status. Patient mental status is alert and oriented x 2.   Carotid ultrasound showed 100% right carotid occlusion and 80-90% left carotid occlusion. Patient is symptomatic at this time. Vascular consulted. No surgical interventions at this time.   Hgb dropped to 6.8 s/p 1 unit of prbc's. Consult urology in am for hematuria. Monitor cbc.   OBJECTIVE:  Vital Signs Last 24 Hrs  T(C): 36.7 (01 Oct 2023 14:30), Max: 36.9 (30 Sep 2023 20:48)  T(F): 98.1 (01 Oct 2023 14:30), Max: 98.5 (01 Oct 2023 04:10)  HR: 64 (01 Oct 2023 14:30) (64 - 75)  BP: 123/58 (01 Oct 2023 14:30) (119/57 - 144/69)  BP(mean): --  RR: 18 (01 Oct 2023 14:30) (18 - 18)  SpO2: 98% (01 Oct 2023 14:30) (97% - 99%)    Parameters below as of 01 Oct 2023 14:30  Patient On (Oxygen Delivery Method): nasal cannula  O2 Flow (L/min): 2      FOCUSED PHYSICAL EXAM:  Neuro: awake, alert, oriented x 2.  son at the bedside  Cardiovascular: Pulses +2 B/L in lower and upper extremities, HR regular, BP stable, No edema.  Respiratory: Respirations regular, unlabored, breath sounds clear B/L.   GI: Abdomen soft, non-tender, positive bowel sounds.  : no bladder distention noted. hematuria   Skin: Dry, intact, no bruising, no diaphoresis.    I&O's    10-01-23 @ 07:01  -  10-01-23 @ 16:04  --------------------------------------------------------  IN: 0 mL / OUT: 600 mL / NET: -600 mL        LABS:                        6.9    8.76  )-----------( 76       ( 01 Oct 2023 09:59 )             22.8     10-01    142  |  112<H>  |  20<H>  ----------------------------<  86  3.8   |  26  |  0.81    Ca    7.7<L>      01 Oct 2023 06:28  Phos  2.4     10-01  Mg     2.2     10-01    TPro  7.9  /  Alb  3.0<L>  /  TBili  0.4  /  DBili  x   /  AST  37  /  ALT  26  /  AlkPhos  77  09-29        MICROBIOLOGY:        EKG:  Sinus rhythm with sinus arrhythmia with occasional Premature ventricular complexes    IMGAGING: Severe atherosclerotic plaque at both carotid bulbs.    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  Severe stenosis in the   proximal left internal carotid artery measuring greater than 70% using   NASCET criteria.    There is low velocity flow in the proximal left vertebral artery which   may be indicative of underlying atherosclerotic disease.        ASSESSMENT: Patient with carotid artery stenosis and hematuria. Patient with hgb of 6.9. Asymtomatic   Denies headaches, shortness of breath, and chest pain     HPI:  Pt is an 87-yo M, coming from [facility], w carotid artery stenosis, HTN, depression, HLD, hx TIA/CVA?, on home O2 2L NC, presents w SOB. Pt is a very poor narrator, collateral obtained from chart review. As per chart review, EMS stated when they arrived at the scene patient appeared tachypneic to 30s patient was already on nonrebreather, and unconscious, when pt arrived to ED he had been on NRB for approx 1 hr and was awake and able to say his name. ED spoke with the pt's son who is HCP at cell #[phone] -states few weeks ago patient was admitted at [facility] for pneumonia.  Patient reportedly started Vicon for 4 weeks and became very weak was unable to walk.  Patient was discharged to subacute rehab at that time. States he saw the patient yesterday and he was answering all questions appropriately.  Did not have any complaints at that time. Of note, son states pt develops distress if he is laid flat. States he will not be able to tolerate lying flat for CT and does not want him to get it. States this has been on going for years. Pt sleeps on a recliner at home. In the ED, pt was suctioned and found to have copious mucus and food chunks in airway, once he was suctioned, he was back on 2L NC which is his baseline.     On interview pt was able to provide very little information, denied pain, discomfort, dyspnea, dysuria.    (30 Sep 2023 00:50)      PLAN:     #carotid stenosis  # aspiration pneumonia   #Parainfluenza infection   #hematuria     - ultrasound shows occlusion in the right and left carotid artery   - CT confirms Severe atherosclerotic calcification at both carotid bifurcations,   and secretions along the right lateral wall the upper trachea  - c/w zosyn 3.375   - f/u cultures   - may follow up with Dr. Martinez  at [place]   - vascular consulted- no surgical interventions at this time   - Duonebs q6h   - Asp precaution   - S/S eval   - hgb dropped to 6.9 s/p 1 units prbc's   - trend cbc  - consider boyce catheter for strict I & O's  - bladder scan q 6 hours   - consult urology in am 10/2 EVENT: Patient with hematuria and carotid stenosis. Patient denies headache, numbness, or change in mental status. Patient mental status is alert and oriented x 2.   Carotid ultrasound showed 100% right carotid occlusion and 80-90% left carotid occlusion. Patient is symptomatic at this time. Vascular consulted. No surgical interventions at this time.   Hgb dropped to 6.8 s/p 1 unit of prbc's. Consult urology in pm for hematuria. Monitor cbc.       OBJECTIVE:  Vital Signs Last 24 Hrs  T(C): 36.7 (01 Oct 2023 14:30), Max: 36.9 (30 Sep 2023 20:48)  T(F): 98.1 (01 Oct 2023 14:30), Max: 98.5 (01 Oct 2023 04:10)  HR: 64 (01 Oct 2023 14:30) (64 - 75)  BP: 123/58 (01 Oct 2023 14:30) (119/57 - 144/69)  BP(mean): --  RR: 18 (01 Oct 2023 14:30) (18 - 18)  SpO2: 98% (01 Oct 2023 14:30) (97% - 99%)    Parameters below as of 01 Oct 2023 14:30  Patient On (Oxygen Delivery Method): nasal cannula  O2 Flow (L/min): 2      FOCUSED PHYSICAL EXAM:  Neuro: awake, alert, oriented x 2.  son at the bedside  Cardiovascular: Pulses +2 B/L in lower and upper extremities, HR regular, BP stable, No edema.  Respiratory: Respirations regular, unlabored, breath sounds clear B/L.   GI: Abdomen soft, non-tender, positive bowel sounds.  : no bladder distention noted. hematuria   Skin: Dry, intact, no bruising, no diaphoresis.    I&O's    10-01-23 @ 07:01  -  10-01-23 @ 16:04  --------------------------------------------------------  IN: 0 mL / OUT: 600 mL / NET: -600 mL        LABS:                        6.9    8.76  )-----------( 76       ( 01 Oct 2023 09:59 )             22.8     10-01    142  |  112<H>  |  20<H>  ----------------------------<  86  3.8   |  26  |  0.81    Ca    7.7<L>      01 Oct 2023 06:28  Phos  2.4     10-01  Mg     2.2     10-01    TPro  7.9  /  Alb  3.0<L>  /  TBili  0.4  /  DBili  x   /  AST  37  /  ALT  26  /  AlkPhos  77  09-29        MICROBIOLOGY:        EKG:  Sinus rhythm with sinus arrhythmia with occasional Premature ventricular complexes    IMGAGING: Severe atherosclerotic plaque at both carotid bulbs.    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  Severe stenosis in the   proximal left internal carotid artery measuring greater than 70% using   NASCET criteria.    There is low velocity flow in the proximal left vertebral artery which   may be indicative of underlying atherosclerotic disease.        ASSESSMENT: Patient with carotid artery stenosis and hematuria. Patient with hgb of 6.9. Asymtomatic   Denies headaches, shortness of breath, and chest pain     HPI:  Pt is an 87-yo M, coming from [facility], w carotid artery stenosis, HTN, depression, HLD, hx TIA/CVA?, on home O2 2L NC, presents w SOB. Pt is a very poor narrator, collateral obtained from chart review. As per chart review, EMS stated when they arrived at the scene patient appeared tachypneic to 30s patient was already on nonrebreather, and unconscious, when pt arrived to ED he had been on NRB for approx 1 hr and was awake and able to say his name. ED spoke with the pt's son who is HCP at cell #[phone] -states few weeks ago patient was admitted at [facility] for pneumonia.  Patient reportedly started Vicon for 4 weeks and became very weak was unable to walk.  Patient was discharged to subacute rehab at that time. States he saw the patient yesterday and he was answering all questions appropriately.  Did not have any complaints at that time. Of note, son states pt develops distress if he is laid flat. States he will not be able to tolerate lying flat for CT and does not want him to get it. States this has been on going for years. Pt sleeps on a recliner at home. In the ED, pt was suctioned and found to have copious mucus and food chunks in airway, once he was suctioned, he was back on 2L NC which is his baseline.     On interview pt was able to provide very little information, denied pain, discomfort, dyspnea, dysuria.    (30 Sep 2023 00:50)      PLAN:     #carotid stenosis  # aspiration pneumonia   #Parainfluenza infection   #hematuria     - ultrasound shows occlusion in the right and left carotid artery   - CT confirms Severe atherosclerotic calcification at both carotid bifurcations,   and secretions along the right lateral wall the upper trachea  - c/w aspirin   - lovenox on hold in setting of hematuria   - SCD for prophylaxis   - c/w zosyn 3.375   - f/u cultures   - may follow up with Dr. Martinez  at [place]   - vascular consulted- no surgical interventions at this time   - Duonebs q6h   - Asp precaution   - S/S eval   - hgb dropped to 6.9 s/p 1 units prbc's   - trend cbc  - consider boyce catheter for strict I & O's  - bladder scan q 6 hours   - consult urology in am 10/2

## 2023-10-01 NOTE — PROGRESS NOTE ADULT - SUBJECTIVE AND OBJECTIVE BOX
SUBJECTIVE / OVERNIGHT EVENTS - pt seen and examined  hematuria+  10-01-23    MEDICATIONS  (STANDING):  amLODIPine   Tablet 5 milliGRAM(s) Oral daily  aspirin enteric coated 81 milliGRAM(s) Oral daily  FLUoxetine 10 milliGRAM(s) Oral daily  folic acid 1 milliGRAM(s) Oral daily  lisinopril 10 milliGRAM(s) Oral daily  LORazepam     Tablet 1 milliGRAM(s) Oral at bedtime  multivitamin 1 Tablet(s) Oral daily  piperacillin/tazobactam IVPB.. 3.375 Gram(s) IV Intermittent every 8 hours  sodium chloride 0.9%. 1000 milliLiter(s) (80 mL/Hr) IV Continuous <Continuous>  tamsulosin 0.4 milliGRAM(s) Oral at bedtime    MEDICATIONS  (PRN):  acetaminophen     Tablet .. 650 milliGRAM(s) Oral every 6 hours PRN Temp greater or equal to 38C (100.4F), Mild Pain (1 - 3)  guaiFENesin Oral Liquid (Sugar-Free) 100 milliGRAM(s) Oral every 6 hours PRN Cough  nystatin Powder 1 Application(s) Topical three times a day PRN skin care  ondansetron Injectable 4 milliGRAM(s) IV Push every 8 hours PRN Nausea and/or Vomiting      Vital Signs Last 24 Hrs  T(C): 36.3 (10-01-23 @ 20:17), Max: 36.9 (09-30-23 @ 20:48)  T(F): 97.4 (10-01-23 @ 20:17), Max: 98.5 (10-01-23 @ 04:10)  HR: 59 (10-01-23 @ 20:17) (59 - 75)  BP: 133/71 (10-01-23 @ 20:17) (119/57 - 144/69)  BP(mean): --  RR: 18 (10-01-23 @ 20:17) (18 - 18)  SpO2: 99% (10-01-23 @ 20:17) (97% - 99%)        I&O's Summary    01 Oct 2023 07:01  -  01 Oct 2023 20:41  --------------------------------------------------------  IN: 0 mL / OUT: 600 mL / NET: -600 mL        Constitutional: No fever, fatigue  Skin: No rash.  Eyes: No recent vision problems or eye pain.  ENT: No congestion, ear pain, or sore throat.  Cardiovascular: No chest pain or palpation.  Respiratory: No cough, shortness of breath, congestion, or wheezing.  Gastrointestinal: No abdominal pain, nausea, vomiting, or diarrhea.  Genitourinary: No dysuria.  Musculoskeletal: No joint swelling.  Neurologic: No headache.    PHYSICAL EXAM:  GENERAL: NAD  EYES: EOMI, PERRLA  NECK: Supple, No JVD  CHEST/LUNG: dec breath sounds at bases  HEART:  S1 , S2 +  ABDOMEN: soft, bs+  EXTREMITIES:  trace edema  NEUROLOGY:alert awake      LABS:                        8.2    7.08  )-----------( 79       ( 01 Oct 2023 17:30 )             25.5     10-01    142  |  112<H>  |  20<H>  ----------------------------<  86  3.8   |  26  |  0.81    Ca    7.7<L>      01 Oct 2023 06:28  Phos  2.4     10-01  Mg     2.2     10-01            Urinalysis Basic - ( 01 Oct 2023 06:28 )    Color: x / Appearance: x / SG: x / pH: x  Gluc: 86 mg/dL / Ketone: x  / Bili: x / Urobili: x   Blood: x / Protein: x / Nitrite: x   Leuk Esterase: x / RBC: x / WBC x   Sq Epi: x / Non Sq Epi: x / Bacteria: x        RADIOLOGY & ADDITIONAL TESTS:    Imaging Personally Reviewed:yes    Consultant(s) Notes Reviewed:  yes    Care Discussed with Consultants/Other Providers: yes

## 2023-10-01 NOTE — PROGRESS NOTE ADULT - SUBJECTIVE AND OBJECTIVE BOX
Patient is seen and examined at the bed side, is afebrile. The Blood cultures have NGTD. The Leukocytosis trended down to normal. The events noted regarding dropping H/h and hematuria.       REVIEW OF SYSTEMS: All other review systems are negative      ALLERGIES: No Known Allergies      Vital Signs Last 24 Hrs  T(C): 36.6 (01 Oct 2023 17:02), Max: 36.9 (30 Sep 2023 20:48)  T(F): 97.8 (01 Oct 2023 17:02), Max: 98.5 (01 Oct 2023 04:10)  HR: 62 (01 Oct 2023 17:02) (62 - 75)  BP: 128/69 (01 Oct 2023 17:02) (119/57 - 144/69)  BP(mean): --  RR: 18 (01 Oct 2023 17:02) (18 - 18)  SpO2: 97% (01 Oct 2023 17:02) (97% - 99%)    Parameters below as of 01 Oct 2023 17:02  Patient On (Oxygen Delivery Method): nasal cannula  O2 Flow (L/min): 2        PHYSICAL EXAM:  GENERAL: Not in distress , on oxygen via NC  CHEST/LUNG: Not using accessory muscles  HEART: s1 and s2 present  ABDOMEN:  Nontender and  Nondistended  EXTREMITIES: No pedal  edema  CNS: Awake and Alert      LABS:                        8.2    7.08  )-----------( 79       ( 01 Oct 2023 17:30 )             25.5                           8.1    39.39 )-----------( 104      ( 30 Sep 2023 04:50 )             26.0       10-01    142  |  112<H>  |  20<H>  ----------------------------<  86  3.8   |  26  |  0.81    Ca    7.7<L>      01 Oct 2023 06:28  Phos  2.4     10-01  Mg     2.2     10-01      09-30    143  |  109<H>  |  20<H>  ----------------------------<  129<H>  4.0   |  23  |  1.01    Ca    7.9<L>      30 Sep 2023 04:50  Phos  2.9     09-30  Mg     1.9     09-30    TPro  7.9  /  Alb  3.0<L>  /  TBili  0.4  /  DBili  x   /  AST  37  /  ALT  26  /  AlkPhos  77  09-29    PT/INR - ( 29 Sep 2023 16:15 )   PT: 12.3 sec;   INR: 1.08 ratio    PTT - ( 29 Sep 2023 16:15 )  PTT:38.6 sec      CAPILLARY BLOOD GLUCOSE  POCT Blood Glucose.: 203 mg/dL (29 Sep 2023 16:03)        Urinalysis Basic - ( 30 Sep 2023 04:50 )  Color: x / Appearance: x / SG: x / pH: x  Gluc: 129 mg/dL / Ketone: x  / Bili: x / Urobili: x   Blood: x / Protein: x / Nitrite: x   Leuk Esterase: x / RBC: x / WBC x   Sq Epi: x / Non Sq Epi: x / Bacteria: x      Procalcitonin, Serum (10.01.23 @ 06:28) : 1.43    MEDICATIONS  (STANDING):    amLODIPine   Tablet 5 milliGRAM(s) Oral daily  aspirin enteric coated 81 milliGRAM(s) Oral daily  FLUoxetine 10 milliGRAM(s) Oral daily  folic acid 1 milliGRAM(s) Oral daily  lisinopril 10 milliGRAM(s) Oral daily  LORazepam     Tablet 1 milliGRAM(s) Oral at bedtime  multivitamin 1 Tablet(s) Oral daily  piperacillin/tazobactam IVPB.. 3.375 Gram(s) IV Intermittent every 8 hours  sodium chloride 0.9%. 1000 milliLiter(s) (80 mL/Hr) IV Continuous <Continuous>  tamsulosin 0.4 milliGRAM(s) Oral at bedtime        RADIOLOGY & ADDITIONAL TESTS:    9/29/23: Xray Chest 1 View- PORTABLE-Urgent (Xray Chest 1 View- PORTABLE-Urgent .) (09.29.23 @ 16:24) >    IMPRESSION: Left lower lobe infiltrate/atelectasis/effusion for which  follow-up radiographs are suggested.      MICROBIOLOGY DATA:  Culture - Blood (09.29.23 @ 16:30)   Specimen Source: .Blood Blood-Peripheral  Culture Results: No growth at 24 hours    Culture - Blood (09.29.23 @ 16:15)   Specimen Source: .Blood Blood-Peripheral  Culture Results: No growth at 24 hours    Respiratory Viral Panel with COVID-19 by RILEY (09.29.23 @ 16:15)   Rapid RVP Result: Detected  SARS-CoV-2: NotDetec: This

## 2023-10-01 NOTE — PHARMACOTHERAPY INTERVENTION NOTE - COMMENTS
Patient identified by [facility] LIST for Heart Failure. Pertinent medications were reviewed and no additional interventions at this time.

## 2023-10-01 NOTE — SWALLOW BEDSIDE ASSESSMENT ADULT - COMMENTS
Pt AA+Ox1, responsive to all queries and single one-step commands in English and Stateless (translation provided by son at bedside), HOB elevated to 90°, baseline cough. Pt seen for bedside swallow evaluation; per son at bedside, pt was on a regular diet and thin liquids without overt difficulties to swallow. Pt asked to sleep and refused PO trials of more advanced consistencies. Per son, pt ate a crepe right before swallow evaluation (RN aware) and therefore was not very hungry,

## 2023-10-01 NOTE — PROGRESS NOTE ADULT - SUBJECTIVE AND OBJECTIVE BOX
Time of Visit:  Patient seen and examined.     MEDICATIONS  (STANDING):  amLODIPine   Tablet 5 milliGRAM(s) Oral daily  aspirin enteric coated 81 milliGRAM(s) Oral daily  FLUoxetine 10 milliGRAM(s) Oral daily  folic acid 1 milliGRAM(s) Oral daily  lisinopril 10 milliGRAM(s) Oral daily  LORazepam     Tablet 1 milliGRAM(s) Oral at bedtime  multivitamin 1 Tablet(s) Oral daily  piperacillin/tazobactam IVPB.. 3.375 Gram(s) IV Intermittent every 8 hours  sodium chloride 0.9%. 1000 milliLiter(s) (80 mL/Hr) IV Continuous <Continuous>  tamsulosin 0.4 milliGRAM(s) Oral at bedtime      MEDICATIONS  (PRN):  acetaminophen     Tablet .. 650 milliGRAM(s) Oral every 6 hours PRN Temp greater or equal to 38C (100.4F), Mild Pain (1 - 3)  guaiFENesin Oral Liquid (Sugar-Free) 100 milliGRAM(s) Oral every 6 hours PRN Cough  nystatin Powder 1 Application(s) Topical three times a day PRN skin care  ondansetron Injectable 4 milliGRAM(s) IV Push every 8 hours PRN Nausea and/or Vomiting       Medications up to date at time of exam.      PHYSICAL EXAMINATION:  Patient has no new complaints.  GENERAL: The patient is a well-developed, well-nourished, in no apparent distress.     Vital Signs Last 24 Hrs  T(C): 36.7 (01 Oct 2023 14:30), Max: 36.9 (30 Sep 2023 20:48)  T(F): 98.1 (01 Oct 2023 14:30), Max: 98.5 (01 Oct 2023 04:10)  HR: 64 (01 Oct 2023 14:30) (64 - 75)  BP: 123/58 (01 Oct 2023 14:30) (119/57 - 144/69)  BP(mean): --  RR: 18 (01 Oct 2023 14:30) (18 - 18)  SpO2: 98% (01 Oct 2023 14:30) (97% - 99%)    Parameters below as of 01 Oct 2023 14:30  Patient On (Oxygen Delivery Method): nasal cannula  O2 Flow (L/min): 2     (if applicable)    Chest Tube (if applicable)    HEENT: Head is normocephalic and atraumatic. Extraocular muscles are intact. Mucous membranes are moist.     NECK: Supple, no palpable adenopathy.    LUNGS: Clear to auscultation, no wheezing, rales, or rhonchi.    HEART: Regular rate and rhythm without murmur.    ABDOMEN: Soft, nontender, and nondistended.  No hepatosplenomegaly is noted.    : No painful voiding, no pelvic pain    EXTREMITIES: Without any cyanosis, clubbing, rash, lesions or edema.    NEUROLOGIC: Awake, alert, oriented, grossly intact    SKIN: Warm, dry, good turgor.      LABS:                        6.9    8.76  )-----------( 76       ( 01 Oct 2023 09:59 )             22.8     10-01    142  |  112<H>  |  20<H>  ----------------------------<  86  3.8   |  26  |  0.81    Ca    7.7<L>      01 Oct 2023 06:28  Phos  2.4     10-01  Mg     2.2     10-01        Urinalysis Basic - ( 01 Oct 2023 06:28 )    Color: x / Appearance: x / SG: x / pH: x  Gluc: 86 mg/dL / Ketone: x  / Bili: x / Urobili: x   Blood: x / Protein: x / Nitrite: x   Leuk Esterase: x / RBC: x / WBC x   Sq Epi: x / Non Sq Epi: x / Bacteria: x                Lactate, Blood: 1.8 mmol/L (09-30-23 @ 18:20)    Procalcitonin, Serum: 1.43 ng/mL (10-01-23 @ 06:28)      MICROBIOLOGY: (if applicable)    RADIOLOGY & ADDITIONAL STUDIES:  EKG:   CXR:  ECHO:    IMPRESSION: 87y Male PAST MEDICAL & SURGICAL HISTORY:  High cholesterol      Depression      Carotid artery stenosis      CVA (cerebrovascular accident)      History of TIAs       p/w         IMP: This is an  87-yr  man non smoker , coming from [facility], w carotid artery stenosis, HTN, depression, HLD, hx TIA/CVA?, chronic hypoxic resp failure requiring supp O2 @  2L NC, presents with  SOB and hypoxic resp failure .  Pt is a very poor historian , collateral obtained from chart review. As per chart review, EMS stated when they arrived at the scene patient appeared tachypneic to 30s patient was already on nonrebreather, and unconscious, when pt arrived to ED he had been on NRB for approx 1 hr and was awake and able to say his name. In the ED, pt was suctioned and found to have copious mucus and food chunks in airway, once he was suctioned, he was back on 2L NC which is his baseline.  Pat is on droplet isolation for parainfluenza infection . Lactate elevated s/p 2 L IVF      ASSESSMENT     - Acute on chronic hypoxic resp failure   - Asp PNA , UTI   - Parainfluenza infection   - CVA vs TIA   - HTN HLD   - Carotid stenosis   - Elevated lactate     Sugg  - Continue O2 supp as needed to maintain sat >90%   - Agreed with Zosyn   - F/U cultures neg for now   - Duonebs q6h   - Asp precaution   - S/S eval   - Lactate normal   - Drop in H/H   - 1 U PRBC transfusion   - Isolation: droplet   - DVT GI prophy   - Son refused CT chest as per EMR

## 2023-10-01 NOTE — SWALLOW BEDSIDE ASSESSMENT ADULT - PHARYNGEAL PHASE
Delayed pharyngeal swallow cough post swallow, apparently not related to swallow/Delayed pharyngeal swallow Within functional limits

## 2023-10-01 NOTE — CONSULT NOTE ADULT - SUBJECTIVE AND OBJECTIVE BOX
VASCULAR SURGERY CONSULT  88 y/o male from [facility] acute rehab with past medical history of carotid artery stenosis, HTN, depression, on home 2L NC admitted to medicine with AHRF 2/2 aspiration pneumonia. As per chart, pt was suctioned in the ED and found to have copious mucus and food chunks in airway, once he was suctioned, he was back on 2L NC which is his baseline. Pt is currently saturating well on NC. Vascular surgery consulted for surgical evaluation of carotid artery stenosis found on US. Pt seen and examined at bedside. Pt is oriented to person, place, time; however, poor historian- unable to answer pertinent questions. Collateral information obtained from son, Alex, over the phone, and patient chart. Son admits patient recently admitted to [facility] for pneumonia where it was discovered that pt has 100% R blockage in carotid artery and 90% in L carotid. Pt had a follow up appointment in 1.5 months with [place] vascular surgeon, unsure of the name and what follow up appointment was intended for. Son states patient knew about carotid stenosis in prior years, the blockages were about 50% 7 years ago, however, no follow up was pursued on the issue. Pt does not have an outpatient vascular surgeon. Pt is unable to lay flat 2/2 SOB/hypoxia, pt sleeps on recliner at home with elevated HOB. Pt is unable to ambulate. Pt does not have history of stroke, TIA, etc. Does not use anticoagulation medications. According to son, pt would want to pursue surgical intervention if medically cleared. Pt denies smoking. Pt denies dizziness, syncope, blurred vision, fatigue, unilateral weakness.     Son- Alex: [phone]    PAST MEDICAL & SURGICAL HISTORY:  High cholesterol  Depression  Carotid artery stenosis  CVA (cerebrovascular accident)  History of TIAs    MEDICATIONS  (STANDING):  amLODIPine   Tablet 5 milliGRAM(s) Oral daily  aspirin enteric coated 81 milliGRAM(s) Oral daily  enoxaparin Injectable 40 milliGRAM(s) SubCutaneous every 24 hours  FLUoxetine 10 milliGRAM(s) Oral daily  folic acid 1 milliGRAM(s) Oral daily  lisinopril 10 milliGRAM(s) Oral daily  LORazepam     Tablet 1 milliGRAM(s) Oral at bedtime  multivitamin 1 Tablet(s) Oral daily  piperacillin/tazobactam IVPB.. 3.375 Gram(s) IV Intermittent every 8 hours  sodium chloride 0.9%. 1000 milliLiter(s) (80 mL/Hr) IV Continuous <Continuous>  tamsulosin 0.4 milliGRAM(s) Oral at bedtime    MEDICATIONS  (PRN):  acetaminophen     Tablet .. 650 milliGRAM(s) Oral every 6 hours PRN Temp greater or equal to 38C (100.4F), Mild Pain (1 - 3)  guaiFENesin Oral Liquid (Sugar-Free) 100 milliGRAM(s) Oral every 6 hours PRN Cough  nystatin Powder 1 Application(s) Topical three times a day PRN skin care  ondansetron Injectable 4 milliGRAM(s) IV Push every 8 hours PRN Nausea and/or Vomiting      Allergies    No Known Allergies    Intolerances        Vital Signs Last 24 Hrs  T(C): 36.9 (01 Oct 2023 04:10), Max: 37.1 (30 Sep 2023 13:40)  T(F): 98.5 (01 Oct 2023 04:10), Max: 98.8 (30 Sep 2023 13:40)  HR: 75 (01 Oct 2023 04:10) (75 - 85)  BP: 126/69 (01 Oct 2023 04:10) (126/69 - 144/69)  BP(mean): --  RR: 18 (01 Oct 2023 04:10) (18 - 20)  SpO2: 99% (01 Oct 2023 04:10) (97% - 99%)    Parameters below as of 01 Oct 2023 04:10  Patient On (Oxygen Delivery Method): nasal cannula  O2 Flow (L/min): 25      Physical:  Gen: A&O. Poor historian. NAD.  Resp: Unlabored breathing. Equal chest rise bilaterally. Saturating well on 2L NC.   Head: No facial droop noted. No speech slurring.   Neck: Carotid pulse intact to light palpation. No bruits auscultated. No JVD noted.   UE: Equal  strength b/l. 5/5 UE flexion/extension against resistance intact b/l.   LE: Poor strength, cannot lift LE without assistance b/l.         I&O's Detail      LABS:                        6.9    8.76  )-----------( 76       ( 01 Oct 2023 09:59 )             22.8              10-01    142  |  112<H>  |  20<H>  ----------------------------<  86  3.8   |  26  |  0.81    Ca    7.7<L>      01 Oct 2023 06:28  Phos  2.4     10-01  Mg     2.2     10-01    TPro  7.9  /  Alb  3.0<L>  /  TBili  0.4  /  DBili  x   /  AST  37  /  ALT  26  /  AlkPhos  77  09-29            PT/INR - ( 29 Sep 2023 16:15 )   PT: 12.3 sec;   INR: 1.08 ratio         PTT - ( 29 Sep 2023 16:15 )  PTT:38.6 sec  Urinalysis Basic - ( 01 Oct 2023 06:28 )    Color: x / Appearance: x / SG: x / pH: x  Gluc: 86 mg/dL / Ketone: x  / Bili: x / Urobili: x   Blood: x / Protein: x / Nitrite: x   Leuk Esterase: x / RBC: x / WBC x   Sq Epi: x / Non Sq Epi: x / Bacteria: x        RADIOLOGY & ADDITIONAL STUDIES:

## 2023-10-02 DIAGNOSIS — Z02.9 ENCOUNTER FOR ADMINISTRATIVE EXAMINATIONS, UNSPECIFIED: ICD-10-CM

## 2023-10-02 DIAGNOSIS — R31.9 HEMATURIA, UNSPECIFIED: ICD-10-CM

## 2023-10-02 LAB
ANION GAP SERPL CALC-SCNC: 4 MMOL/L — LOW (ref 5–17)
BUN SERPL-MCNC: 18 MG/DL — SIGNIFICANT CHANGE UP (ref 7–18)
CALCIUM SERPL-MCNC: 7.7 MG/DL — LOW (ref 8.4–10.5)
CHLORIDE SERPL-SCNC: 110 MMOL/L — HIGH (ref 96–108)
CO2 SERPL-SCNC: 26 MMOL/L — SIGNIFICANT CHANGE UP (ref 22–31)
CREAT SERPL-MCNC: 0.7 MG/DL — SIGNIFICANT CHANGE UP (ref 0.5–1.3)
EGFR: 89 ML/MIN/1.73M2 — SIGNIFICANT CHANGE UP
GLUCOSE SERPL-MCNC: 86 MG/DL — SIGNIFICANT CHANGE UP (ref 70–99)
HCT VFR BLD CALC: 27.7 % — LOW (ref 39–50)
HGB BLD-MCNC: 8.9 G/DL — LOW (ref 13–17)
LACTATE SERPL-SCNC: 1 MMOL/L — SIGNIFICANT CHANGE UP (ref 0.7–2)
MAGNESIUM SERPL-MCNC: 2 MG/DL — SIGNIFICANT CHANGE UP (ref 1.6–2.6)
MCHC RBC-ENTMCNC: 32.1 GM/DL — SIGNIFICANT CHANGE UP (ref 32–36)
MCHC RBC-ENTMCNC: 35.2 PG — HIGH (ref 27–34)
MCV RBC AUTO: 109.5 FL — HIGH (ref 80–100)
NRBC # BLD: 0 /100 WBCS — SIGNIFICANT CHANGE UP (ref 0–0)
PHOSPHATE SERPL-MCNC: 1.8 MG/DL — LOW (ref 2.5–4.5)
PLATELET # BLD AUTO: 79 K/UL — LOW (ref 150–400)
POTASSIUM SERPL-MCNC: 3.5 MMOL/L — SIGNIFICANT CHANGE UP (ref 3.5–5.3)
POTASSIUM SERPL-SCNC: 3.5 MMOL/L — SIGNIFICANT CHANGE UP (ref 3.5–5.3)
RBC # BLD: 2.53 M/UL — LOW (ref 4.2–5.8)
RBC # FLD: SIGNIFICANT CHANGE UP (ref 10.3–14.5)
SODIUM SERPL-SCNC: 140 MMOL/L — SIGNIFICANT CHANGE UP (ref 135–145)
WBC # BLD: 4.13 K/UL — SIGNIFICANT CHANGE UP (ref 3.8–10.5)
WBC # FLD AUTO: 4.13 K/UL — SIGNIFICANT CHANGE UP (ref 3.8–10.5)

## 2023-10-02 PROCEDURE — 99223 1ST HOSP IP/OBS HIGH 75: CPT

## 2023-10-02 PROCEDURE — 93970 EXTREMITY STUDY: CPT | Mod: 26

## 2023-10-02 RX ADMIN — Medication 10 MILLIGRAM(S): at 12:03

## 2023-10-02 RX ADMIN — Medication 1 TABLET(S): at 12:03

## 2023-10-02 RX ADMIN — Medication 1 MILLIGRAM(S): at 12:03

## 2023-10-02 RX ADMIN — Medication 81 MILLIGRAM(S): at 12:03

## 2023-10-02 RX ADMIN — AMLODIPINE BESYLATE 5 MILLIGRAM(S): 2.5 TABLET ORAL at 05:47

## 2023-10-02 RX ADMIN — PIPERACILLIN AND TAZOBACTAM 25 GRAM(S): 4; .5 INJECTION, POWDER, LYOPHILIZED, FOR SOLUTION INTRAVENOUS at 07:15

## 2023-10-02 RX ADMIN — Medication 1 MILLIGRAM(S): at 23:09

## 2023-10-02 RX ADMIN — PIPERACILLIN AND TAZOBACTAM 25 GRAM(S): 4; .5 INJECTION, POWDER, LYOPHILIZED, FOR SOLUTION INTRAVENOUS at 23:11

## 2023-10-02 RX ADMIN — PIPERACILLIN AND TAZOBACTAM 25 GRAM(S): 4; .5 INJECTION, POWDER, LYOPHILIZED, FOR SOLUTION INTRAVENOUS at 14:16

## 2023-10-02 RX ADMIN — LISINOPRIL 10 MILLIGRAM(S): 2.5 TABLET ORAL at 05:47

## 2023-10-02 NOTE — CONSULT NOTE ADULT - ATTENDING COMMENTS
86yo M presenting with worsening respiratory distress likely 2/2 aspiration pneumonia and found to have hematuria. Discussed with son regarding his urinary symptoms including frequency with small volume voids. He was noted to have intermittent hematuria over the past month. I have reviewed his labs and discussed with the patient and his family the need for a gross hematuria workup. He should have a CT urogram done while inpatient and can be scheduled as an outpatient for a cystoscopy.     Plan  - Discussed with patient's sone urinary symptoms  - Start tamsulosin and finasteride  - Reviewed labs including negative urine culture  - CT Urogram while inpatient  - FU as an outpatient for cystoscopy

## 2023-10-02 NOTE — PROGRESS NOTE ADULT - ASSESSMENT
87-yo M, coming from [facility], w carotid artery stenosis, HTN, depression, HLD, hx TIA/CVA?, on home O2 2L NC. Admitted for aspiration pneumonia resulting in AHRF, lactic acidosis, and encephalopathy, also found to be parainfluenza pos.  87-yo M, coming from [facility], w carotid artery stenosis, HTN, depression, HLD, hx TIA/CVA?, on home O2 2L NC. Admitted for aspiration pneumonia resulting in AHRF, lactic acidosis, and encephalopathy, also found to be parainfluenza pos, placed on IV Zosyn. ID Dr. Zaman following. Carotid U/S showed sever stenosis of b/l carotid arteries, vascular consulted and no intervention recommended. Hospital course c/b Hematuria, now improving urology following.

## 2023-10-02 NOTE — PROGRESS NOTE ADULT - PROBLEM SELECTOR PLAN 5
- back on home O2 requirements  - hypoxia likely in the setting of aspiration event  - rest of plan as above

## 2023-10-02 NOTE — PROGRESS NOTE ADULT - PROBLEM SELECTOR PLAN 2
lactate 3.7 >> 7.3>>6.1  s/p 2L NS bolus in ED  1L LR at 100 cc/hr    -trend lactate, hydrate aggressively RESOLVED  likely in the setting of aspiration event  lactate 3.7 >> 7.3>>6.1>>1.0  s/p 2L NS bolus in ED

## 2023-10-02 NOTE — PROGRESS NOTE ADULT - PROBLEM SELECTOR PLAN 5
as above  -back on home NC 2L - back on home O2 requirements  - hypoxia likely in the setting of aspiration event  - rest of plan as above

## 2023-10-02 NOTE — PROGRESS NOTE ADULT - PROBLEM SELECTOR PLAN 6
- noted with hematuria on 10/1  - improving today 10/2  - likely in the setting of BPH  - Urology consulted  - reccs CT urogram  - s/p 1 U PRBC  - continue to monitor CBC  - continue to monitor boyce output

## 2023-10-02 NOTE — CONSULT NOTE ADULT - ASSESSMENT
88yo M with aspiration pneumonia and severe carotid artery stenosis found to have hematuria. Patient required transfusion yesterday but urine is currently clear yellow vs light pink so low clinical suspicion for an active, brisk bleed. Hematuria is likley 2/2 BPH given that patient has significant obstructive symptoms but could also be 2/2 to malignancy     Recommendations  -Flomax 0.4mg qHS   -Finasteride 5mg qDay  -Bowel regimen, senna, colace, miralax  -Continue boyce catheter, discharge with boyce catheter given elevated PVR; catheter irrigated with return of minimal clot  - CT urogram to assess for malignancy in  tract  - Cystoscopy outpatient to check for BPH vs bladder tumor  - Not concerned for active bleed, no indication for CBI at this time  - No acute urologic intervention  - Discussed with Dr. Mcneal

## 2023-10-02 NOTE — PROGRESS NOTE ADULT - PROBLEM SELECTOR PLAN 3
mentation appears to be improving, initially unconscious, then awake but not answering questions. On interview pt was oriented to only self but was able to answer simple questions    -likely 2/2 aspiration and parainfluenza - mental status improving  -likely 2/2 aspiration and parainfluenza  - rest of plan as above

## 2023-10-02 NOTE — PROGRESS NOTE ADULT - NS ATTEND AMEND GEN_ALL_CORE FT
Impression: This is an 86 Y/O Male from [facility] presented to ED with SOB . Per chart review per EMS when they arrive at the scene patient appeared Tachypneic to 30s and patient on Non rebreather and unconscious when patient arived ro ED he had been on NRB x 1 hour and was awake and able to say his name. can benefit with CT Chest, per EMR son refused CT Chest . Admitted with Acute on chronic hypoxic respiratory failure secondary to Aspiration Pneumonia , in combination of Bacterial Pneumonia with Parainfluenza infection. Negative Blood Cx x2 and Negative swab for Covid 19.         Suggestion:  O2 saturation 98% with O2 supplement. Continue Oxygen supplementation 2L NC to maintain respiratory stability to prevent hypoxia and tachypnea.  Oral hygiene care.  Aspiration precautions with HOB elevation.  Contact precaution.   Continue  Zoysn 3.375 Gm IVPB Q 8 Hours.   DVT GI prophylactic .

## 2023-10-02 NOTE — PROGRESS NOTE ADULT - PROBLEM SELECTOR PLAN 1
On admission was unconscious and req NRB, after aggressive suctioning which removed food chunks and copious secretions  2/2 aspiration, and pt had recently been admitted to [facility] for aspiration pneumonia  CXR wet read - No consolidations  Not meeting sepsis criteria  s/p vanc + zosyn in ED    -given prior hx of aspiration pneumonia, very likely that pt will continue to have aspiration events  -elevate head of bed to 60-90 deg, do not lay patient flat  -NPO except meds pending s/s eval  -zosyn for now given hx of recent abx use, hospitalization, and stay in [facility]  -f/u bcx, ucx  -ID Dr. Zaman - on admission required NRB, aggressive suctioning which removed food chunks and copious secretions  - CXr + LLL consolidations  - Speech and swallow recommending minced and moist diet  - continue IV Zosyn  -aspiration precautions  -f/u bcx, ucx  -ID Dr. Zaman

## 2023-10-02 NOTE — PROGRESS NOTE ADULT - SUBJECTIVE AND OBJECTIVE BOX
NP Note discussed with  Primary Attending    Patient is a 87y old  Male who presents with a chief complaint of acute hypoxic respiratory failure (02 Oct 2023 14:08)      INTERVAL HPI/OVERNIGHT EVENTS: no acute events overnight    MEDICATIONS  (STANDING):  amLODIPine   Tablet 5 milliGRAM(s) Oral daily  aspirin enteric coated 81 milliGRAM(s) Oral daily  FLUoxetine 10 milliGRAM(s) Oral daily  folic acid 1 milliGRAM(s) Oral daily  lisinopril 10 milliGRAM(s) Oral daily  LORazepam     Tablet 1 milliGRAM(s) Oral at bedtime  multivitamin 1 Tablet(s) Oral daily  piperacillin/tazobactam IVPB.. 3.375 Gram(s) IV Intermittent every 8 hours  tamsulosin 0.4 milliGRAM(s) Oral at bedtime    MEDICATIONS  (PRN):  acetaminophen     Tablet .. 650 milliGRAM(s) Oral every 6 hours PRN Temp greater or equal to 38C (100.4F), Mild Pain (1 - 3)  guaiFENesin Oral Liquid (Sugar-Free) 100 milliGRAM(s) Oral every 6 hours PRN Cough  nystatin Powder 1 Application(s) Topical three times a day PRN skin care  ondansetron Injectable 4 milliGRAM(s) IV Push every 8 hours PRN Nausea and/or Vomiting      __________________________________________________  REVIEW OF SYSTEMS:    limited due to mental status  patient kept repeating "i just want to rest"      Vital Signs Last 24 Hrs  T(C): 37.7 (02 Oct 2023 13:28), Max: 37.7 (02 Oct 2023 13:28)  T(F): 99.8 (02 Oct 2023 13:28), Max: 99.8 (02 Oct 2023 13:28)  HR: 62 (02 Oct 2023 13:28) (59 - 67)  BP: 137/90 (02 Oct 2023 13:28) (128/69 - 147/71)  BP(mean): --  RR: 18 (02 Oct 2023 13:28) (18 - 18)  SpO2: 99% (02 Oct 2023 13:28) (96% - 99%)    Parameters below as of 02 Oct 2023 13:28  Patient On (Oxygen Delivery Method): nasal cannula  O2 Flow (L/min): 2      ________________________________________________  PHYSICAL EXAM:  GENERAL: NAD  HEENT: Normocephalic;  NECK : supple  CHEST/LUNG: Clear to auscultation bilaterally  HEART: S1 S2  regular  ABDOMEN: Soft, Nontender, Nondistended; Bowel sounds present  EXTREMITIES: no edema  SKIN: warm and dry; no rash  NERVOUS SYSTEM:  Awake and alert; Oriented  to person     _________________________________________________  LABS:                        8.2    7.08  )-----------( 79       ( 01 Oct 2023 17:30 )             25.5     10-02    140  |  110<H>  |  18  ----------------------------<  86  3.5   |  26  |  0.70    Ca    7.7<L>      02 Oct 2023 06:03  Phos  1.8     10-02  Mg     2.0     10-02        Urinalysis Basic - ( 02 Oct 2023 06:03 )    Color: x / Appearance: x / SG: x / pH: x  Gluc: 86 mg/dL / Ketone: x  / Bili: x / Urobili: x   Blood: x / Protein: x / Nitrite: x   Leuk Esterase: x / RBC: x / WBC x   Sq Epi: x / Non Sq Epi: x / Bacteria: x      CAPILLARY BLOOD GLUCOSE            RADIOLOGY & ADDITIONAL TESTS:    < from: US Duplex Carotid Arteries Complete, Bilateral (10.01.23 @ 10:46) >  IMPRESSION: Severe atherosclerotic plaque at both carotid bulbs.    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  Severe stenosis in the   proximal left internal carotid artery measuring greater than 70% using   NASCET criteria.    There is low velocity flow in the proximal left vertebral artery which   may be indicative of underlying atherosclerotic disease.    Provider Migdalia Aguilar NP was notified of the findings by the   ultrasound technologist Prince Bryan at the time of ultrasound   examination.  I notified Dr. Rashid and Dr. Hernandez via secure message on   TEAMS on 10/01/2023 at 1:31 PM.    Measurement of carotid stenosis is based on velocity parameters that   correlate the residual internal carotid diameter withthat of the more   distal vessel in accordance with a method such as the [place] American   Symptomatic Carotid Endarterectomy Trial (NASCET).    --- End of Report ---    < end of copied text >    < from: CT Neck Soft Tissue No Cont (10.01.23 @ 10:12) >  IMPRESSION:  No evidence of neck mass, fluid collection/abscess, soft tissue edema,   soft tissue gas or foreign body, within limits of noncontrast CT technique    The visualized lungs are motion degraded.  There are patchy groundglass   and tree-in-bud nodular opacities in both upper lobes.  The findings may   be secondary to aspiration.  Bronchopneumonia, atypical lung infections   and other causes of inflammatory small airways disease are in the   differential diagnosis.    Adherent secretions along the right lateral wall the upper trachea.    Severe atherosclerotic calcification at both carotid bifurcations.  The   findings correspond to right internal carotid artery stenosis and severe   stenosis left internal carotid artery on the same day carotid duplex   ultrasound.    Near complete opacification of the right mastoid air cells.  Correlate   clinically for sterile effusion versus acute otitis media.    --- End of Report ---    < end of copied text >      Imaging Personally Reviewed:  YES    Consultant(s) Notes Reviewed:   YES      Plan of care was discussed with patient and /or primary care giver; all questions and concerns were addressed and care was aligned with patient's wishes.

## 2023-10-02 NOTE — PROGRESS NOTE ADULT - ASSESSMENT
Patient is a 87y old  Male who is coming from [facility], with carotid artery stenosis, HTN, depression, HLD, hx TIA/CVA?, on home O2 2L NC, now presents to the ER for evaluation of SOB. Pt is a very poor narrator, collateral obtained from chart review. As per chart review, EMS stated when they arrived at the scene patient appeared tachypneic to 30s, was already on nonrebreather, and unconscious, when pt arrived to ER he had been on NRB for approx 1 hr and was awake and able to say his name. ED spoke with the pt's son who is HCP at cell #[phone] -states few weeks ago patient was admitted at [facility] for pneumonia.  Patient reportedly started Vicon for 4 weeks and became very weak was unable to walk.  Patient was discharged to subacute rehab at that time. Son saw the patient day prior and he was answering all questions appropriately.  Did not have any complaints at that time. ON admission, he found to have no fever, but after patient was suctioned and found to have copious mucus and food chunks in airway, once he was suctioned, he was back on 2L NC which is his baseline. He has started on  Zosyn and the ID consult requested to assist with further evaluation and antibiotic management.    # Aspiration pneumonitis- s/p suctioned copious mucus and food chunks from airway in ER  # Parainfluenza viral pneumonia  # Leukocytosis- resolved  # Hematuria- dropped H/H and s/p Transfused 1 U PRBC    would recommend:    1.  Follow up Urology recommendation   2. Monitor h/h and transfuse as needed, consider holding the Anticoagulation   3. Continue Zosyn to treat superimposed bacterial pneumonia, since Procalcitonin level is elevated, 1.43  4. Continue Supportive care for Parainfluenza   5. Supplemental oxygenation and Bronchodilator as needed  6. Isolation as per Protocol       Attending Attestation:    Spent more than 45 minutes on total encounter, more than 50 % of the visit was spent counseling and/or coordinating care by the Attending physician.

## 2023-10-02 NOTE — PROGRESS NOTE ADULT - SUBJECTIVE AND OBJECTIVE BOX
Time of Visit:  Patient seen and examined.     MEDICATIONS  (STANDING):  amLODIPine   Tablet 5 milliGRAM(s) Oral daily  aspirin enteric coated 81 milliGRAM(s) Oral daily  FLUoxetine 10 milliGRAM(s) Oral daily  folic acid 1 milliGRAM(s) Oral daily  lisinopril 10 milliGRAM(s) Oral daily  LORazepam     Tablet 1 milliGRAM(s) Oral at bedtime  multivitamin 1 Tablet(s) Oral daily  piperacillin/tazobactam IVPB.. 3.375 Gram(s) IV Intermittent every 8 hours  tamsulosin 0.4 milliGRAM(s) Oral at bedtime      MEDICATIONS  (PRN):  acetaminophen     Tablet .. 650 milliGRAM(s) Oral every 6 hours PRN Temp greater or equal to 38C (100.4F), Mild Pain (1 - 3)  guaiFENesin Oral Liquid (Sugar-Free) 100 milliGRAM(s) Oral every 6 hours PRN Cough  nystatin Powder 1 Application(s) Topical three times a day PRN skin care  ondansetron Injectable 4 milliGRAM(s) IV Push every 8 hours PRN Nausea and/or Vomiting       Medications up to date at time of exam.    ROS; No fever, chills, cough, nasal congestion .   PHYSICAL EXAMINATION:    Vital Signs Last 24 Hrs  T(C): 37.7 (02 Oct 2023 13:28), Max: 37.7 (02 Oct 2023 13:28)  T(F): 99.8 (02 Oct 2023 13:28), Max: 99.8 (02 Oct 2023 13:28)  HR: 62 (02 Oct 2023 13:28) (59 - 67)  BP: 137/90 (02 Oct 2023 13:28) (128/69 - 147/71)  BP(mean): --  RR: 18 (02 Oct 2023 13:28) (18 - 18)  SpO2: 99% (02 Oct 2023 13:28) (96% - 99%)    Parameters below as of 02 Oct 2023 13:28  Patient On (Oxygen Delivery Method): nasal cannula  O2 Flow (L/min): 2     (if applicable)    General : Alert and oriented, forgetful . Able to answer question with no SOB. No acute distress .     HEENT: Head is normocephalic and atraumatic. No nasal tenderness . Mucous membranes are moist.     NECK: Supple, no palpable adenopathy.    LUNGS: Non labored. No wheezing. No use of accessory muscle.     HEART: S1 S2 Regular rate and no click / rub.     ABDOMEN: Soft, nontender, and nondistended. Active bowel sounds.     EXTREMITIES: Without any cyanosis, clubbing, rash, lesions .    NEUROLOGIC: Awake, alert, oriented, forgetful .     SKIN: Warm and moist . Non diaphoretic .       LABS:                        8.2    7.08  )-----------( 79       ( 01 Oct 2023 17:30 )             25.5     10-02    140  |  110<H>  |  18  ----------------------------<  86  3.5   |  26  |  0.70    Ca    7.7<L>      02 Oct 2023 06:03  Phos  1.8     10-02  Mg     2.0     10-02        Urinalysis Basic - ( 02 Oct 2023 06:03 )    Color: x / Appearance: x / SG: x / pH: x  Gluc: 86 mg/dL / Ketone: x  / Bili: x / Urobili: x   Blood: x / Protein: x / Nitrite: x   Leuk Esterase: x / RBC: x / WBC x   Sq Epi: x / Non Sq Epi: x / Bacteria: x                Lactate, Blood: 1.0 mmol/L (10-02-23 @ 06:05)    Procalcitonin, Serum: 1.43 ng/mL (10-01-23 @ 06:28)      MICROBIOLOGY: (if applicable)    RADIOLOGY & ADDITIONAL STUDIES:  EKG:   CXR:  ECHO:    IMPRESSION: 87y Male PAST MEDICAL & SURGICAL HISTORY:  High cholesterol      Depression      Carotid artery stenosis      CVA (cerebrovascular accident)      History of TIAs    Impression: This is an 88 Y/O Male from [facility] presented to ED with SOB . Per chart review per EMS when they arrive at the scene patient appeared Tachypneic to 30s and patient on Non rebreather and unconscious when patient arived ro ED he had been on NRB x 1 hour and was awake and able to say his name. can benefit with CT Chest, per EMR son refused CT Chest . Admitted with Acute on chronic hypoxic respiratory failure secondary to Aspiration Pneumonia , in combination of Bacterial Pneumonia with Parainfluenza infection. Negative Blood Cx x2 and Negative swab for Covid 19.         Suggestion:  O2 saturation 98% with O2 supplement. Continue Oxygen supplementation 2L NC to maintain respiratory stability to prevent hypoxia and tachypnea.  Oral hygiene care.  Aspiration precautions with HOB elevation.  Contact precaution.   Continue  Zoysn 3.375 Gm IVPB Q 8 Hours.   DVT GI prophylactic .

## 2023-10-02 NOTE — PROGRESS NOTE ADULT - PROBLEM SELECTOR PLAN 1
- on admission required NRB, aggressive suctioning which removed food chunks and copious secretions  - CXr + LLL consolidations  - Speech and swallow recommending minced and moist diet  - continue IV Zosyn  -aspiration precautions  -f/u bcx, ucx  -ID Dr. Zaman

## 2023-10-02 NOTE — PROGRESS NOTE ADULT - PROBLEM SELECTOR PLAN 8
CT neck as above  -carotid doppler: severe b/l carotid artery stenosis3  vascular reccs: no acute intervention  continue o/p f/u with scheduled appointment

## 2023-10-02 NOTE — PROGRESS NOTE ADULT - PROBLEM SELECTOR PLAN 6
amlodipine/benazapril home >> amlodipine + lisinopril while i/p w parameters - noted with hematuria on 10/1  - improving today 10/2  - likely in the setting of BPH  - Urology consulted  - [facility] CT urogram  - continue to monitor boyce output - noted with hematuria on 10/1  - improving today 10/2  - likely in the setting of BPH  - Urology consulted  - reccs CT urogram  - s/p 1 U PRBC  - continue to monitor CBC  - continue to monitor boyce output

## 2023-10-02 NOTE — PROGRESS NOTE ADULT - ASSESSMENT
87-yo M, coming from [facility], w carotid artery stenosis, HTN, depression, HLD, hx TIA/CVA?, on home O2 2L NC. Admitted for aspiration pneumonia resulting in AHRF, lactic acidosis, and encephalopathy, also found to be parainfluenza pos, placed on IV Zosyn. ID Dr. Zaman following. Carotid U/S showed sever stenosis of b/l carotid arteries, vascular consulted and no intervention recommended. Hospital course c/b Hematuria, now improving urology following.

## 2023-10-02 NOTE — PROGRESS NOTE ADULT - SUBJECTIVE AND OBJECTIVE BOX
Patient is seen and examined at the bed side, is afebrile. He mentioned feeling okay, the H/h is stable.       REVIEW OF SYSTEMS: All other review systems are negative      ALLERGIES: No Known Allergies      Vital Signs Last 24 Hrs  T(C): 37.7 (02 Oct 2023 13:28), Max: 37.7 (02 Oct 2023 13:28)  T(F): 99.8 (02 Oct 2023 13:28), Max: 99.8 (02 Oct 2023 13:28)  HR: 62 (02 Oct 2023 13:28) (59 - 67)  BP: 137/90 (02 Oct 2023 13:28) (131/64 - 147/71)  BP(mean): --  RR: 18 (02 Oct 2023 13:28) (18 - 18)  SpO2: 99% (02 Oct 2023 13:28) (96% - 99%)    Parameters below as of 02 Oct 2023 13:28  Patient On (Oxygen Delivery Method): nasal cannula  O2 Flow (L/min): 2        PHYSICAL EXAM:  GENERAL: Not in distress , on oxygen via NC  CHEST/LUNG: Not using accessory muscles  HEART: s1 and s2 present  ABDOMEN:  Nontender and  Nondistended  EXTREMITIES: No pedal  edema  CNS: Awake and Alert      LABS:                        8.9    4.13  )-----------( 79       ( 02 Oct 2023 16:14 )             27.7                           8.1    39.39 )-----------( 104      ( 30 Sep 2023 04:50 )             26.0         10-02    140  |  110<H>  |  18  ----------------------------<  86  3.5   |  26  |  0.70    Ca    7.7<L>      02 Oct 2023 06:03  Phos  1.8     10-02  Mg     2.0     10-02      10-01    142  |  112<H>  |  20<H>  ----------------------------<  86  3.8   |  26  |  0.81    Ca    7.7<L>      01 Oct 2023 06:28  Phos  2.4     10-01  Mg     2.2     10-01    TPro  7.9  /  Alb  3.0<L>  /  TBili  0.4  /  DBili  x   /  AST  37  /  ALT  26  /  AlkPhos  77  09-29    PT/INR - ( 29 Sep 2023 16:15 )   PT: 12.3 sec;   INR: 1.08 ratio    PTT - ( 29 Sep 2023 16:15 )  PTT:38.6 sec      CAPILLARY BLOOD GLUCOSE  POCT Blood Glucose.: 203 mg/dL (29 Sep 2023 16:03)        Urinalysis Basic - ( 30 Sep 2023 04:50 )  Color: x / Appearance: x / SG: x / pH: x  Gluc: 129 mg/dL / Ketone: x  / Bili: x / Urobili: x   Blood: x / Protein: x / Nitrite: x   Leuk Esterase: x / RBC: x / WBC x   Sq Epi: x / Non Sq Epi: x / Bacteria: x      Procalcitonin, Serum (10.01.23 @ 06:28) : 1.43        MEDICATIONS  (STANDING):    amLODIPine   Tablet 5 milliGRAM(s) Oral daily  aspirin enteric coated 81 milliGRAM(s) Oral daily  FLUoxetine 10 milliGRAM(s) Oral daily  folic acid 1 milliGRAM(s) Oral daily  lisinopril 10 milliGRAM(s) Oral daily  LORazepam     Tablet 1 milliGRAM(s) Oral at bedtime  multivitamin 1 Tablet(s) Oral daily  piperacillin/tazobactam IVPB.. 3.375 Gram(s) IV Intermittent every 8 hours  tamsulosin 0.4 milliGRAM(s) Oral at bedtime      RADIOLOGY & ADDITIONAL TESTS:    9/29/23: Xray Chest 1 View- PORTABLE-Urgent (Xray Chest 1 View- PORTABLE-Urgent .) (09.29.23 @ 16:24) >    IMPRESSION: Left lower lobe infiltrate/atelectasis/effusion for which  follow-up radiographs are suggested.      MICROBIOLOGY DATA:      Culture - Blood (09.29.23 @ 16:30)   Specimen Source: .Blood Blood-Peripheral  Culture Results: No growth at 48 hours    Culture - Blood (09.29.23 @ 16:15)   Specimen Source: .Blood Blood-Peripheral  Culture Results: No growth at 48 hours    Respiratory Viral Panel with COVID-19 by RILEY (09.29.23 @ 16:15)   Rapid RVP Result: Detected  SARS-CoV-2: NotDetec: This

## 2023-10-02 NOTE — PROGRESS NOTE ADULT - PROBLEM SELECTOR PLAN 8
not on statin  f/u lipid panel CT neck as above  -carotid doppler: severe b/l carotid artery stenosis3  vascular reccs: no acute intervention  continue o/p f/u with scheduled appointment

## 2023-10-02 NOTE — CONSULT NOTE ADULT - SUBJECTIVE AND OBJECTIVE BOX
HPI  86 y/o male from [facility] acute rehab with past medical history of carotid artery stenosis, HTN, depression, on home 2L NC admitted to medicine with AHRF 2/2 aspiration pneumonia. As per chart, pt was suctioned in the ED and found to have copious mucus and food chunks in airway, once he was suctioned, he was back on 2L NC which is his baseline. Pt is currently saturating well on NC. Vascular surgery consulted for surgical evaluation of carotid artery stenosis found on US. Pt seen and examined at bedside. Pt is oriented to person, place, time; however, poor historian- unable to answer pertinent questions. Collateral information obtained from son, Alex, over the phone, and patient chart. Son admits patient recently admitted to [facility] for pneumonia where it was discovered that pt has 100% R blockage in carotid artery and 90% in L carotid. Pt had a follow up appointment in 1.5 months with [place] vascular surgeon, unsure of the name and what follow up appointment was intended for.    Urology was consulted for hematuria. Patient is a 88yo M presenting with worsening respiratory distress likely 2/2 aspiration pneumonia and found to have hematuria for which urology was consulted. Patient is not a good historian but in speaking with the son he states that at baseline his father has difficulty with urination including frequency, urgency, incomplete emptying, weak stream, hesitancy and dribbling. He also states that for the past few months he will have intermittent episodes of hematuria typically 1x month which self resolve. Patient also found to have elevated PVR of 156cc for which a boyce catheter was placed this admission. Patients urine color is currently clear yellow vs light pink with some bloody clot in the tubing. Patient has no complaints at this time. Patient is currently hemodynamically stable. Labs notable for a H/H of 8.2/25.5, WBC of 7.08 (down from 40 on admission), and a Cr of .70. Of note patient had a transfusion yesterday for a hgb of 6.9 and has responded appropriately. UA reactive but not overtly positive for UTI.      PAST MEDICAL & SURGICAL HISTORY:  High cholesterol      Depression      Carotid artery stenosis      CVA (cerebrovascular accident)      History of TIAs          MEDICATIONS  (STANDING):  amLODIPine   Tablet 5 milliGRAM(s) Oral daily  aspirin enteric coated 81 milliGRAM(s) Oral daily  FLUoxetine 10 milliGRAM(s) Oral daily  folic acid 1 milliGRAM(s) Oral daily  lisinopril 10 milliGRAM(s) Oral daily  LORazepam     Tablet 1 milliGRAM(s) Oral at bedtime  multivitamin 1 Tablet(s) Oral daily  piperacillin/tazobactam IVPB.. 3.375 Gram(s) IV Intermittent every 8 hours  tamsulosin 0.4 milliGRAM(s) Oral at bedtime    MEDICATIONS  (PRN):  acetaminophen     Tablet .. 650 milliGRAM(s) Oral every 6 hours PRN Temp greater or equal to 38C (100.4F), Mild Pain (1 - 3)  guaiFENesin Oral Liquid (Sugar-Free) 100 milliGRAM(s) Oral every 6 hours PRN Cough  nystatin Powder 1 Application(s) Topical three times a day PRN skin care  ondansetron Injectable 4 milliGRAM(s) IV Push every 8 hours PRN Nausea and/or Vomiting      FAMILY HISTORY:      Allergies    No Known Allergies    Intolerances        SOCIAL HISTORY:    REVIEW OF SYSTEMS: Otherwise negative as stated in HPI    Physical Exam  Vital signs  T(C): 36.3 (10-02-23 @ 05:33), Max: 36.7 (10-01-23 @ 14:30)  HR: 61 (10-02-23 @ 05:33)  BP: 131/64 (10-02-23 @ 05:33)  SpO2: 96% (10-02-23 @ 05:33)  Wt(kg): --    Output    OUT:    Indwelling Catheter - Urethral (mL): 250 mL    Voided (mL): 600 mL  Total OUT: 850 mL    Total NET: -850 mL          Gen:  NAD    Pulm:  No respiratory distress  	  CV:  RRR    GI:  S/ND/NT    :  Boyce catheter in place draining clear yellow urine with some intermittent clots    MSK:      LABS:      10-02 @ 06:03    WBC --    / Hct --    / SCr 0.70     10-01 @ 17:30    WBC 7.08  / Hct 25.5  / SCr --       10-02    140  |  110<H>  |  18  ----------------------------<  86  3.5   |  26  |  0.70    Ca    7.7<L>      02 Oct 2023 06:03  Phos  1.8     10-02  Mg     2.0     10-02        Urinalysis Basic - ( 02 Oct 2023 06:03 )    Color: x / Appearance: x / SG: x / pH: x  Gluc: 86 mg/dL / Ketone: x  / Bili: x / Urobili: x   Blood: x / Protein: x / Nitrite: x   Leuk Esterase: x / RBC: x / WBC x   Sq Epi: x / Non Sq Epi: x / Bacteria: x        Urine Cx: negative  Blood Cx: NGTD    RADIOLOGY:

## 2023-10-02 NOTE — PROGRESS NOTE ADULT - PROBLEM SELECTOR PLAN 2
RESOLVED  likely in the setting of aspiration event  lactate 3.7 >> 7.3>>6.1>>1.0  s/p 2L NS bolus in ED

## 2023-10-02 NOTE — PROGRESS NOTE ADULT - SUBJECTIVE AND OBJECTIVE BOX
Patient is a 87y old  Male who presents with a chief complaint of acute hypoxic respiratory failure (02 Oct 2023       INTERVAL HPI/OVERNIGHT EVENTS: no acute events overnight    MEDICATIONS  (STANDING):  amLODIPine   Tablet 5 milliGRAM(s) Oral daily  aspirin enteric coated 81 milliGRAM(s) Oral daily  FLUoxetine 10 milliGRAM(s) Oral daily  folic acid 1 milliGRAM(s) Oral daily  lisinopril 10 milliGRAM(s) Oral daily  LORazepam     Tablet 1 milliGRAM(s) Oral at bedtime  multivitamin 1 Tablet(s) Oral daily  piperacillin/tazobactam IVPB.. 3.375 Gram(s) IV Intermittent every 8 hours  tamsulosin 0.4 milliGRAM(s) Oral at bedtime    MEDICATIONS  (PRN):  acetaminophen     Tablet .. 650 milliGRAM(s) Oral every 6 hours PRN Temp greater or equal to 38C (100.4F), Mild Pain (1 - 3)  guaiFENesin Oral Liquid (Sugar-Free) 100 milliGRAM(s) Oral every 6 hours PRN Cough  nystatin Powder 1 Application(s) Topical three times a day PRN skin care  ondansetron Injectable 4 milliGRAM(s) IV Push every 8 hours PRN Nausea and/or Vomiting      __________________________________________________  REVIEW OF SYSTEMS:    limited due to mental status  patient kept repeating "i just want to rest"      Vital Signs Last 24 Hrs  T(C): 37.7 (02 Oct 2023 13:28), Max: 37.7 (02 Oct 2023 13:28)  T(F): 99.8 (02 Oct 2023 13:28), Max: 99.8 (02 Oct 2023 13:28)  HR: 62 (02 Oct 2023 13:28) (59 - 67)  BP: 137/90 (02 Oct 2023 13:28) (128/69 - 147/71)  BP(mean): --  RR: 18 (02 Oct 2023 13:28) (18 - 18)  SpO2: 99% (02 Oct 2023 13:28) (96% - 99%)    Parameters below as of 02 Oct 2023 13:28  Patient On (Oxygen Delivery Method): nasal cannula  O2 Flow (L/min): 2      ________________________________________________  PHYSICAL EXAM:  GENERAL: NAD  HEENT: Normocephalic;  NECK : supple  CHEST/LUNG: dec breath sounds at bases  HEART: S1 S2  regular  ABDOMEN: Soft, Nontender, Nondistended; Bowel sounds present  EXTREMITIES: no edema  SKIN: warm and dry; no rash  NERVOUS SYSTEM:  Awake and alert; _________________________________________________  LABS:                        8.2    7.08  )-----------( 79       ( 01 Oct 2023 17:30 )             25.5     10-02    140  |  110<H>  |  18  ----------------------------<  86  3.5   |  26  |  0.70    Ca    7.7<L>      02 Oct 2023 06:03  Phos  1.8     10-02  Mg     2.0     10-02        Urinalysis Basic - ( 02 Oct 2023 06:03 )    Color: x / Appearance: x / SG: x / pH: x  Gluc: 86 mg/dL / Ketone: x  / Bili: x / Urobili: x   Blood: x / Protein: x / Nitrite: x   Leuk Esterase: x / RBC: x / WBC x   Sq Epi: x / Non Sq Epi: x / Bacteria: x      CAPILLARY BLOOD GLUCOSE            RADIOLOGY & ADDITIONAL TESTS:    < from: US Duplex Carotid Arteries Complete, Bilateral (10.01.23 @ 10:46) >  IMPRESSION: Severe atherosclerotic plaque at both carotid bulbs.    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  Severe stenosis in the   proximal left internal carotid artery measuring greater than 70% using   NASCET criteria.    There is low velocity flow in the proximal left vertebral artery which   may be indicative of underlying atherosclerotic disease.    Provider Migdalia Aguilar NP was notified of the findings by the   ultrasound technologist Prince Bryan at the time of ultrasound   examination.  I notified Dr. Rashid and Dr. Hernandez via secure message on   TEAMS on 10/01/2023 at 1:31 PM.    Measurement of carotid stenosis is based on velocity parameters that   correlate the residual internal carotid diameter withthat of the more   distal vessel in accordance with a method such as the [place] American   Symptomatic Carotid Endarterectomy Trial (NASCET).    --- End of Report ---    < end of copied text >    < from: CT Neck Soft Tissue No Cont (10.01.23 @ 10:12) >  IMPRESSION:  No evidence of neck mass, fluid collection/abscess, soft tissue edema,   soft tissue gas or foreign body, within limits of noncontrast CT technique    The visualized lungs are motion degraded.  There are patchy groundglass   and tree-in-bud nodular opacities in both upper lobes.  The findings may   be secondary to aspiration.  Bronchopneumonia, atypical lung infections   and other causes of inflammatory small airways disease are in the   differential diagnosis.    Adherent secretions along the right lateral wall the upper trachea.    Severe atherosclerotic calcification at both carotid bifurcations.  The   findings correspond to right internal carotid artery stenosis and severe   stenosis left internal carotid artery on the same day carotid duplex   ultrasound.    Near complete opacification of the right mastoid air cells.  Correlate   clinically for sterile effusion versus acute otitis media.    --- End of Report ---    < end of copied text >      Imaging Personally Reviewed:  YES    Consultant(s) Notes Reviewed:   YES      Plan of care was discussed with patient and /or primary care giver; all questions and concerns were addressed and care was aligned with patient's wishes.

## 2023-10-02 NOTE — PROGRESS NOTE ADULT - PROBLEM SELECTOR PLAN 7
unk if pt follows w vascular o/p  Family refused CT scan    -carotid doppler noted to be on amlodipine/benazapril home  continue amlodipine and lisnopril  -Bp controlled

## 2023-10-03 DIAGNOSIS — S91.109A UNSPECIFIED OPEN WOUND OF UNSPECIFIED TOE(S) WITHOUT DAMAGE TO NAIL, INITIAL ENCOUNTER: ICD-10-CM

## 2023-10-03 RX ORDER — SENNA PLUS 8.6 MG/1
2 TABLET ORAL AT BEDTIME
Refills: 0 | Status: DISCONTINUED | OUTPATIENT
Start: 2023-10-03 | End: 2023-10-04

## 2023-10-03 RX ORDER — CHLORHEXIDINE GLUCONATE 213 G/1000ML
1 SOLUTION TOPICAL
Refills: 0 | Status: DISCONTINUED | OUTPATIENT
Start: 2023-10-03 | End: 2023-10-04

## 2023-10-03 RX ORDER — POLYETHYLENE GLYCOL 3350 17 G/17G
17 POWDER, FOR SOLUTION ORAL DAILY
Refills: 0 | Status: DISCONTINUED | OUTPATIENT
Start: 2023-10-03 | End: 2023-10-04

## 2023-10-03 RX ORDER — FINASTERIDE 5 MG/1
5 TABLET, FILM COATED ORAL DAILY
Refills: 0 | Status: DISCONTINUED | OUTPATIENT
Start: 2023-10-03 | End: 2023-10-04

## 2023-10-03 RX ADMIN — LISINOPRIL 10 MILLIGRAM(S): 2.5 TABLET ORAL at 06:37

## 2023-10-03 RX ADMIN — PIPERACILLIN AND TAZOBACTAM 25 GRAM(S): 4; .5 INJECTION, POWDER, LYOPHILIZED, FOR SOLUTION INTRAVENOUS at 21:13

## 2023-10-03 RX ADMIN — Medication 100 MILLIGRAM(S): at 21:15

## 2023-10-03 RX ADMIN — Medication 81 MILLIGRAM(S): at 12:04

## 2023-10-03 RX ADMIN — PIPERACILLIN AND TAZOBACTAM 25 GRAM(S): 4; .5 INJECTION, POWDER, LYOPHILIZED, FOR SOLUTION INTRAVENOUS at 13:02

## 2023-10-03 RX ADMIN — SENNA PLUS 2 TABLET(S): 8.6 TABLET ORAL at 21:13

## 2023-10-03 RX ADMIN — Medication 10 MILLIGRAM(S): at 12:04

## 2023-10-03 RX ADMIN — PIPERACILLIN AND TAZOBACTAM 25 GRAM(S): 4; .5 INJECTION, POWDER, LYOPHILIZED, FOR SOLUTION INTRAVENOUS at 06:37

## 2023-10-03 RX ADMIN — Medication 1 MILLIGRAM(S): at 12:04

## 2023-10-03 RX ADMIN — AMLODIPINE BESYLATE 5 MILLIGRAM(S): 2.5 TABLET ORAL at 06:30

## 2023-10-03 RX ADMIN — TAMSULOSIN HYDROCHLORIDE 0.4 MILLIGRAM(S): 0.4 CAPSULE ORAL at 21:13

## 2023-10-03 RX ADMIN — Medication 1 TABLET(S): at 12:04

## 2023-10-03 RX ADMIN — Medication 1 MILLIGRAM(S): at 21:14

## 2023-10-03 RX ADMIN — CHLORHEXIDINE GLUCONATE 1 APPLICATION(S): 213 SOLUTION TOPICAL at 06:37

## 2023-10-03 RX ADMIN — FINASTERIDE 5 MILLIGRAM(S): 5 TABLET, FILM COATED ORAL at 12:50

## 2023-10-03 RX ADMIN — POLYETHYLENE GLYCOL 3350 17 GRAM(S): 17 POWDER, FOR SOLUTION ORAL at 12:03

## 2023-10-03 NOTE — PROGRESS NOTE ADULT - ASSESSMENT
Patient is a 87y old  Male who is coming from [facility], with carotid artery stenosis, HTN, depression, HLD, hx TIA/CVA?, on home O2 2L NC, now presents to the ER for evaluation of SOB. Pt is a very poor narrator, collateral obtained from chart review. As per chart review, EMS stated when they arrived at the scene patient appeared tachypneic to 30s, was already on nonrebreather, and unconscious, when pt arrived to ER he had been on NRB for approx 1 hr and was awake and able to say his name. ED spoke with the pt's son who is HCP at cell #[phone] -states few weeks ago patient was admitted at [facility] for pneumonia.  Patient reportedly started Vicon for 4 weeks and became very weak was unable to walk.  Patient was discharged to subacute rehab at that time. Son saw the patient day prior and he was answering all questions appropriately.  Did not have any complaints at that time. ON admission, he found to have no fever, but after patient was suctioned and found to have copious mucus and food chunks in airway, once he was suctioned, he was back on 2L NC which is his baseline. He has started on  Zosyn and the ID consult requested to assist with further evaluation and antibiotic management.    # Aspiration pneumonitis- s/p suctioned copious mucus and food chunks from airway in ER  # Parainfluenza viral pneumonia  # Leukocytosis- resolved  # Hematuria- dropped H/H and s/p Transfused 1 U PRBC    would recommend:    1. OOB to chair   2. Monitor h/h and transfuse as needed, consider holding the Anticoagulation   3. Continue Zosyn to treat superimposed bacterial pneumonia, since Procalcitonin level is elevated, 1.43, may change to oral Augmentin 875 mg [id number] on discharge to continue until 10/6/23  4. Continue Supportive care for Parainfluenza   5. Supplemental oxygenation and Bronchodilator as needed  6. Isolation as per Protocol     d/w covering Magdalene YL    Attending Attestation:    Spent more than 35 minutes on total encounter, more than 50 % of the visit was spent counseling and/or coordinating care by the Attending physician.   Patient is a 87y old  Male who is coming from [facility], with carotid artery stenosis, HTN, depression, HLD, hx TIA/CVA?, on home O2 2L NC, now presents to the ER for evaluation of SOB. Pt is a very poor narrator, collateral obtained from chart review. As per chart review, EMS stated when they arrived at the scene patient appeared tachypneic to 30s, was already on nonrebreather, and unconscious, when pt arrived to ER he had been on NRB for approx 1 hr and was awake and able to say his name. ED spoke with the pt's son who is HCP at cell #[phone] -states few weeks ago patient was admitted at [facility] for pneumonia.  Patient reportedly started Vicon for 4 weeks and became very weak was unable to walk.  Patient was discharged to subacute rehab at that time. Son saw the patient day prior and he was answering all questions appropriately.  Did not have any complaints at that time. ON admission, he found to have no fever, but after patient was suctioned and found to have copious mucus and food chunks in airway, once he was suctioned, he was back on 2L NC which is his baseline. He has started on  Zosyn and the ID consult requested to assist with further evaluation and antibiotic management.    # Aspiration pneumonitis- s/p suctioned copious mucus and food chunks from airway in ER  # Parainfluenza viral pneumonia  # Leukocytosis- resolved  # Hematuria- dropped H/H and s/p Transfused 1 U PRBC    would recommend:    1. OOB to chair   2. Monitor h/h and transfuse as needed, consider holding the Anticoagulation   3. Continue Zosyn to treat superimposed bacterial pneumonia, since Procalcitonin level is elevated, 1.43, may change to oral Augmentin 875 mg [id number] on discharge to continue until 10/6/23  4. Continue Supportive care for Parainfluenza   5. Supplemental oxygenation and Bronchodilator as needed  6. Isolation as per Protocol     d/w covering Magdalene LY    Attending Attestation:    Spent more than 35 minutes on total encounter, more than 50 % of the visit was spent counseling and/or coordinating care by the Attending physician.

## 2023-10-03 NOTE — PROGRESS NOTE ADULT - SUBJECTIVE AND OBJECTIVE BOX
Subjective  Urine color continues to be clear with stable H/H today.    Objective    Vital signs  T(F): , Max: 99.8 (10-02-23 @ 13:28)  HR: 65 (10-03-23 @ 05:28)  BP: 135/74 (10-03-23 @ 05:28)  SpO2: 99% (10-03-23 @ 05:28)  Wt(kg): --    Output     OUT:    Voided (mL): 900 mL  Total OUT: 900 mL    Total NET: -900 mL          Gen: NAD  Abd: soft, nontender, nondistended  : boyce secured in place, draining CYU    Labs      10-02 @ 16:14    WBC 4.13  / Hct 27.7  / SCr --       10-02 @ 06:03    WBC --    / Hct --    / SCr 0.70         Culture - Urine (collected 09-30-23 @ 04:30)  Source: Clean Catch Clean Catch (Midstream)  Final Report (10-01-23 @ 10:25):    <10,000 CFU/mL Normal Urogenital Daysi    Culture - Blood (collected 09-29-23 @ 16:30)  Source: .Blood Blood-Peripheral  Preliminary Report (10-02-23 @ 21:01):    No growth at 72 Hours    Culture - Blood (collected 09-29-23 @ 16:15)  Source: .Blood Blood-Peripheral  Preliminary Report (10-02-23 @ 21:01):    No growth at 72 Hours        Urine Cx: ?  Blood Cx: ?    Imaging

## 2023-10-03 NOTE — PROGRESS NOTE ADULT - PROBLEM SELECTOR PLAN 4
supportive care  isolation precaution  guaifenasin PRN  tylenol PRN

## 2023-10-03 NOTE — PROGRESS NOTE ADULT - ASSESSMENT
87-yo M, coming from [facility], w carotid artery stenosis, HTN, depression, HLD, hx TIA/CVA?, on home O2 2L NC. Admitted for aspiration pneumonia resulting in AHRF, lactic acidosis, and encephalopathy, also found to be parainfluenza pos, placed on IV Zosyn. ID Dr. Zaman following. Carotid U/S showed sever stenosis of b/l carotid arteries, vascular consulted and no intervention recommended. Hospital course c/b Hematuria, now improving urology following.

## 2023-10-03 NOTE — PROGRESS NOTE ADULT - PROBLEM SELECTOR PLAN 6
- noted with hematuria on 10/1  - continues to improve  - likely in the setting of BPH  - Urology consulted  - reccs CT urogram- son refusing at this time because patient will not tolerate  - s/p 1 U PRBC  - continue to monitor CBC  - continue to monitor boyce output  - can continue workup outpatient

## 2023-10-03 NOTE — DIETITIAN INITIAL EVALUATION ADULT - PERTINENT LABORATORY DATA
10-02    140  |  110<H>  |  18  ----------------------------<  86  3.5   |  26  |  0.70    Ca    7.7<L>      02 Oct 2023 06:03  Phos  1.8     10-02  Mg     2.0     10-02     English

## 2023-10-03 NOTE — ADVANCED PRACTICE NURSE CONSULT - ASSESSMENT
This is a 87yr old male patient admitted for Shortness of Breath, presenting with the following:  -There is a R. Great Toe wound to which there will be a Podiatry consultation for evaluation and treatment of this issue.   -There is a R. Martin scabbed wound with stable eschar and no drainage  -There is evidence of Moisture Associated Fungal Dermatitis to the Perianal, Gluteal Fold, Coccyx, Perineal, and Bilateral Inner Thigh areas  -There is evidence of blanchable erythema to the Bilateral Heels

## 2023-10-03 NOTE — PROGRESS NOTE ADULT - SUBJECTIVE AND OBJECTIVE BOX
Patient is a 87y old  Male who presents with a chief complaint of acute hypoxic respiratory failure (03 Oct 2023       INTERVAL HPI/OVERNIGHT EVENTS: pt seen and examined    MEDICATIONS  (STANDING):  amLODIPine   Tablet 5 milliGRAM(s) Oral daily  aspirin enteric coated 81 milliGRAM(s) Oral daily  chlorhexidine 2% Cloths 1 Application(s) Topical <User Schedule>  finasteride 5 milliGRAM(s) Oral daily  FLUoxetine 10 milliGRAM(s) Oral daily  folic acid 1 milliGRAM(s) Oral daily  lisinopril 10 milliGRAM(s) Oral daily  LORazepam     Tablet 1 milliGRAM(s) Oral at bedtime  multivitamin 1 Tablet(s) Oral daily  piperacillin/tazobactam IVPB.. 3.375 Gram(s) IV Intermittent every 8 hours  polyethylene glycol 3350 17 Gram(s) Oral daily  senna 2 Tablet(s) Oral at bedtime  tamsulosin 0.4 milliGRAM(s) Oral at bedtime    MEDICATIONS  (PRN):  acetaminophen     Tablet .. 650 milliGRAM(s) Oral every 6 hours PRN Temp greater or equal to 38C (100.4F), Mild Pain (1 - 3)  guaiFENesin Oral Liquid (Sugar-Free) 100 milliGRAM(s) Oral every 6 hours PRN Cough  nystatin Powder 1 Application(s) Topical three times a day PRN skin care  ondansetron Injectable 4 milliGRAM(s) IV Push every 8 hours PRN Nausea and/or Vomiting      __________________________________________________  REVIEW OF SYSTEMS:  limited due to mental status  patient kept repeating "i just want to rest"    Vital Signs Last 24 Hrs  T(C): 36.5 (10-03-23 @ 20:38), Max: 36.8 (10-03-23 @ 14:18)  T(F): 97.7 (10-03-23 @ 20:38), Max: 98.3 (10-03-23 @ 14:18)  HR: 64 (10-03-23 @ 20:38) (64 - 66)  BP: 154/60 (10-03-23 @ 20:38) (135/74 - 154/60)  BP(mean): --  RR: 18 (10-03-23 @ 20:38) (18 - 18)  SpO2: 98% (10-03-23 @ 20:38) (97% - 99%)        ________________________________________________  PHYSICAL EXAM:  GENERAL: NAD  HEENT: Normocephalic;  NECK : supple  CHEST/LUNG: dec breath sounds at bases  HEART: S1 S2  regular  ABDOMEN: Soft, Nontender, Nondistended; Bowel sounds present  EXTREMITIES: no edema  SKIN: warm and dry; no rash  NERVOUS SYSTEM:  Awake and alert;  _________________________________________________  LABS:                        8.9    4.13  )-----------( 79       ( 02 Oct 2023 16:14 )             27.7     10-02    140  |  110<H>  |  18  ----------------------------<  86  3.5   |  26  |  0.70    Ca    7.7<L>      02 Oct 2023 06:03  Phos  1.8     10-02  Mg     2.0     10-02        Urinalysis Basic - ( 02 Oct 2023 06:03 )    Color: x / Appearance: x / SG: x / pH: x  Gluc: 86 mg/dL / Ketone: x  / Bili: x / Urobili: x   Blood: x / Protein: x / Nitrite: x   Leuk Esterase: x / RBC: x / WBC x   Sq Epi: x / Non Sq Epi: x / Bacteria: x      CAPILLARY BLOOD GLUCOSE            RADIOLOGY & ADDITIONAL TESTS:    < from: US Duplex Carotid Arteries Complete, Bilateral (10.01.23 @ 10:46) >  IMPRESSION: Severe atherosclerotic plaque at both carotid bulbs.    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  Severe stenosis in the   proximal left internal carotid artery measuring greater than 70% using   NASCET criteria.    There is low velocity flow in the proximal left vertebral artery which   may be indicative of underlying atherosclerotic disease.    Provider Migdalia Aguilar NP was notified of the findings by the   ultrasound technologist Prince Bryan at the time of ultrasound   examination.  I notified Dr. Rashid and Dr. Hernandez via secure message on   TEAMS on 10/01/2023 at 1:31 PM.    Measurement of carotid stenosis is based on velocity parameters that   correlate the residual internal carotid diameter withthat of the more   distal vessel in accordance with a method such as the North American   Symptomatic Carotid Endarterectomy Trial (NASCET).    --- End of Report ---    < end of copied text >    < from: CT Neck Soft Tissue No Cont (10.01.23 @ 10:12) >  IMPRESSION:  No evidence of neck mass, fluid collection/abscess, soft tissue edema,   soft tissue gas or foreign body, within limits of noncontrast CT technique    The visualized lungs are motion degraded.  There are patchy groundglass   and tree-in-bud nodular opacities in both upper lobes.  The findings may   be secondary to aspiration.  Bronchopneumonia, atypical lung infections   and other causes of inflammatory small airways disease are in the   differential diagnosis.    Adherent secretions along the right lateral wall the upper trachea.    Severe atherosclerotic calcification at both carotid bifurcations.  The   findings correspond to right internal carotid artery stenosis and severe   stenosis left internal carotid artery on the same day carotid duplex   ultrasound.    Near complete opacification of the right mastoid air cells.  Correlate   clinically for sterile effusion versus acute otitis media.    --- End of Report ---    < end of copied text >    Imaging Personally Reviewed:  YES    Consultant(s) Notes Reviewed:   YES/      Plan of care was discussed with patient and /or primary care giver; all questions and concerns were addressed and care was aligned with patient's wishes.

## 2023-10-03 NOTE — DIETITIAN INITIAL EVALUATION ADULT - OTHER INFO
Noted as poor historian and temporal wasting. (observed seems mild).  Pt s/p SLP 10/1. Pt seen, asleep. RN (previously had pt) reports pt eats could benefit from supplement. No ht. of note: seen by wound care RN, Pressure injury not identified

## 2023-10-03 NOTE — PROGRESS NOTE ADULT - ASSESSMENT
87-yo M, coming from [facility], w carotid artery stenosis, HTN, depression, HLD, hx TIA/CVA?, on home O2 2L NC. Admitted for aspiration pneumonia resulting in AHRF, lactic acidosis, and encephalopathy, also found to be parainfluenza pos, placed on IV Zosyn. ID Dr. Zaman following. Carotid U/S showed sever stenosis of b/l carotid arteries, vascular consulted and no intervention recommended. Hospital course c/b Hematuria, now improving urology following.

## 2023-10-03 NOTE — ADVANCED PRACTICE NURSE CONSULT - RECOMMEDATIONS
-Clean all affected areas with warm water, mild soap, and pat dry  -Apply Antifungal Moisture Barrier Cream to the Perianal, Gluteal Fold, Coccyx, Perineal, and Bilateral Inner Thigh areas b.i.d. PRN  -Frequent toileting  -Leave the [place] wound open to air  -Elevate/float the patients heels using heel protectors and reposition the patient Q 2hrs using wedges or pillows

## 2023-10-03 NOTE — PROGRESS NOTE ADULT - PROBLEM SELECTOR PROBLEM 2
Lactic acid acidosis

## 2023-10-03 NOTE — CONSULT NOTE ADULT - SUBJECTIVE AND OBJECTIVE BOX
88 y/o male from [facility] acute rehab with past medical history of carotid artery stenosis, HTN, depression, on home 2L NC admitted to medicine with AHRF 2/2 aspiration pneumonia. He presents with right hallux wound. he is unsure how long it has been there for. He states that it gives him some pain. Denies constitutional symptoms.       Patient admits to  (-) Fevers, (-) Chills, (-) Nausea, (-) Vomiting, (-) Shortness of Breath (-) calf pain (-) chest pain     Medications acetaminophen     Tablet .. 650 milliGRAM(s) Oral every 6 hours PRN  amLODIPine   Tablet 5 milliGRAM(s) Oral daily  aspirin enteric coated 81 milliGRAM(s) Oral daily  chlorhexidine 2% Cloths 1 Application(s) Topical <User Schedule>  finasteride 5 milliGRAM(s) Oral daily  FLUoxetine 10 milliGRAM(s) Oral daily  folic acid 1 milliGRAM(s) Oral daily  guaiFENesin Oral Liquid (Sugar-Free) 100 milliGRAM(s) Oral every 6 hours PRN  lisinopril 10 milliGRAM(s) Oral daily  LORazepam     Tablet 1 milliGRAM(s) Oral at bedtime  multivitamin 1 Tablet(s) Oral daily  nystatin Powder 1 Application(s) Topical three times a day PRN  ondansetron Injectable 4 milliGRAM(s) IV Push every 8 hours PRN  piperacillin/tazobactam IVPB.. 3.375 Gram(s) IV Intermittent every 8 hours  polyethylene glycol 3350 17 Gram(s) Oral daily  senna 2 Tablet(s) Oral at bedtime  tamsulosin 0.4 milliGRAM(s) Oral at bedtime    FH,   PMHHigh cholesterol    Depression    Carotid artery stenosis    CVA (cerebrovascular accident)    History of TIAs       [facility]    Labs                          8.9    4.13  )-----------( 79       ( 02 Oct 2023 16:14 )             27.7      10-02    140  |  110<H>  |  18  ----------------------------<  86  3.5   |  26  |  0.70    Ca    7.7<L>      02 Oct 2023 06:03  Phos  1.8     10-02  Mg     2.0     10-02       Vital Signs Last 24 Hrs  T(C): 36.8 (03 Oct 2023 14:18), Max: 37.4 (02 Oct 2023 20:05)  T(F): 98.3 (03 Oct 2023 14:18), Max: 99.4 (02 Oct 2023 20:05)  HR: 66 (03 Oct 2023 14:18) (65 - 66)  BP: 146/84 (03 Oct 2023 14:18) (135/74 - 146/84)  BP(mean): --  RR: 18 (03 Oct 2023 14:18) (18 - 18)  SpO2: 97% (03 Oct 2023 14:18) (97% - 99%)    Parameters below as of 03 Oct 2023 14:18  Patient On (Oxygen Delivery Method): nasal cannula  O2 Flow (L/min): 2                ROS: Unremarkable outside HPI    LE Focused Exam  Vascular: Pedal pulses palpableb/l. CFT <3 secs x 10. Temp Gradient warm to cool b/l. Pedal hair absent. = erythema to right hallux  Dermatological: Exposed nailbed with removed toenail to the right hallux, other nails normal. upon expression  <1cc purulent drainage noted from medial nailfold. -PTB -malodor  Neurological: Patient is awake, alert and oriented x3. Gross Epicritic sensation is intact   MSK: Mild pain on palpation to right hallux wound. Arch height 2/5 on-WB, joint ROM wnl with no crepitation. Negative alexus sign b/l         86 y/o male from [facility] acute rehab with past medical history of carotid artery stenosis, HTN, depression, on home 2L NC admitted to medicine with AHRF 2/2 aspiration pneumonia. He presents with right hallux wound. he is unsure how long it has been there for. He states that it gives him some pain. Denies constitutional symptoms.       Patient admits to  (-) Fevers, (-) Chills, (-) Nausea, (-) Vomiting, (-) Shortness of Breath (-) calf pain (-) chest pain     Medications acetaminophen     Tablet .. 650 milliGRAM(s) Oral every 6 hours PRN  amLODIPine   Tablet 5 milliGRAM(s) Oral daily  aspirin enteric coated 81 milliGRAM(s) Oral daily  chlorhexidine 2% Cloths 1 Application(s) Topical <User Schedule>  finasteride 5 milliGRAM(s) Oral daily  FLUoxetine 10 milliGRAM(s) Oral daily  folic acid 1 milliGRAM(s) Oral daily  guaiFENesin Oral Liquid (Sugar-Free) 100 milliGRAM(s) Oral every 6 hours PRN  lisinopril 10 milliGRAM(s) Oral daily  LORazepam     Tablet 1 milliGRAM(s) Oral at bedtime  multivitamin 1 Tablet(s) Oral daily  nystatin Powder 1 Application(s) Topical three times a day PRN  ondansetron Injectable 4 milliGRAM(s) IV Push every 8 hours PRN  piperacillin/tazobactam IVPB.. 3.375 Gram(s) IV Intermittent every 8 hours  polyethylene glycol 3350 17 Gram(s) Oral daily  senna 2 Tablet(s) Oral at bedtime  tamsulosin 0.4 milliGRAM(s) Oral at bedtime    FH,   PMHHigh cholesterol    Depression    Carotid artery stenosis    CVA (cerebrovascular accident)    History of TIAs       [facility]    Labs                          8.9    4.13  )-----------( 79       ( 02 Oct 2023 16:14 )             27.7      10-02    140  |  110<H>  |  18  ----------------------------<  86  3.5   |  26  |  0.70    Ca    7.7<L>      02 Oct 2023 06:03  Phos  1.8     10-02  Mg     2.0     10-02       Vital Signs Last 24 Hrs  T(C): 36.8 (03 Oct 2023 14:18), Max: 37.4 (02 Oct 2023 20:05)  T(F): 98.3 (03 Oct 2023 14:18), Max: 99.4 (02 Oct 2023 20:05)  HR: 66 (03 Oct 2023 14:18) (65 - 66)  BP: 146/84 (03 Oct 2023 14:18) (135/74 - 146/84)  BP(mean): --  RR: 18 (03 Oct 2023 14:18) (18 - 18)  SpO2: 97% (03 Oct 2023 14:18) (97% - 99%)    Parameters below as of 03 Oct 2023 14:18  Patient On (Oxygen Delivery Method): nasal cannula  O2 Flow (L/min): 2                ROS: Unremarkable outside HPI    LE Focused Exam  Vascular: Pedal pulses palpableb/l. CFT <3 secs x 10. Temp Gradient warm to cool b/l. Pedal hair absent. = erythema to right hallux  Dermatological: Exposed nailbed with removed toenail to the right hallux, other nails normal. upon expression  <1cc purulent drainage noted from medial nailfold, no spicules. -PTB -malodor  Neurological: Patient is awake, alert and oriented x3. Gross Epicritic sensation is intact   MSK: Mild pain on palpation to right hallux wound. Arch height 2/5 on-WB, joint ROM wnl with no crepitation. Negative alexus sign b/l

## 2023-10-03 NOTE — DIETITIAN INITIAL EVALUATION ADULT - NSFNSPHYEXAMSKINFT_GEN_A_CORE
Pressure Injury 1: sacrum, Stage II  Pressure Injury 2: Bilateral:, heel, Stage I  Pressure Injury 3: none, none  Pressure Injury 4: none, none  Pressure Injury 5: none, none  Pressure Injury 6: none, none  Pressure Injury 7: none, none  Pressure Injury 8: none, none  Pressure Injury 9: none, none  Pressure Injury 10: none, none  Pressure Injury 11: none, none, Pressure Injury 1: none, Healed, this is not a pressure injury  Pressure Injury 2: none, Healed, blanchable erythema   Pressure Injury 3: none, none  Pressure Injury 4: none, none  Pressure Injury 5: none, none  Pressure Injury 6: none, none  Pressure Injury 7: none, none  Pressure Injury 8: none, none  Pressure Injury 9: none, none  Pressure Injury 10: none, none  Pressure Injury 11: none, none, Pressure Injury 1: sacrum, Stage II  Pressure Injury 2: Bilateral:, heel, Stage I  Pressure Injury 3: none, none  Pressure Injury 4: none, none  Pressure Injury 5: none, none  Pressure Injury 6: none, none  Pressure Injury 7: none, none  Pressure Injury 8: none, none  Pressure Injury 9: none, none  Pressure Injury 10: none, none  Pressure Injury 11: none, none

## 2023-10-03 NOTE — PROGRESS NOTE ADULT - PROBLEM SELECTOR PLAN 9
CT neck as above  -carotid doppler: severe b/l carotid artery stenosis  vascular reccs: no acute intervention  continue o/p f/u with scheduled appointment

## 2023-10-03 NOTE — PROGRESS NOTE ADULT - PROBLEM SELECTOR PLAN 7
- Left Big toe wound noted at nail bed  - son states that he had nail that was growing in odd way and cut it  - patient now with erythema and swelling to Left Big toe  - Podiatry consulted

## 2023-10-03 NOTE — PROGRESS NOTE ADULT - SUBJECTIVE AND OBJECTIVE BOX
NP Note discussed with  Primary Attending    Patient is a 87y old  Male who presents with a chief complaint of acute hypoxic respiratory failure (03 Oct 2023 07:31)      INTERVAL HPI/OVERNIGHT EVENTS: not acute events overnight    MEDICATIONS  (STANDING):  amLODIPine   Tablet 5 milliGRAM(s) Oral daily  aspirin enteric coated 81 milliGRAM(s) Oral daily  chlorhexidine 2% Cloths 1 Application(s) Topical <User Schedule>  finasteride 5 milliGRAM(s) Oral daily  FLUoxetine 10 milliGRAM(s) Oral daily  folic acid 1 milliGRAM(s) Oral daily  lisinopril 10 milliGRAM(s) Oral daily  LORazepam     Tablet 1 milliGRAM(s) Oral at bedtime  multivitamin 1 Tablet(s) Oral daily  piperacillin/tazobactam IVPB.. 3.375 Gram(s) IV Intermittent every 8 hours  polyethylene glycol 3350 17 Gram(s) Oral daily  senna 2 Tablet(s) Oral at bedtime  tamsulosin 0.4 milliGRAM(s) Oral at bedtime    MEDICATIONS  (PRN):  acetaminophen     Tablet .. 650 milliGRAM(s) Oral every 6 hours PRN Temp greater or equal to 38C (100.4F), Mild Pain (1 - 3)  guaiFENesin Oral Liquid (Sugar-Free) 100 milliGRAM(s) Oral every 6 hours PRN Cough  nystatin Powder 1 Application(s) Topical three times a day PRN skin care  ondansetron Injectable 4 milliGRAM(s) IV Push every 8 hours PRN Nausea and/or Vomiting      __________________________________________________  REVIEW OF SYSTEMS:  limited due to mental status  patient kept repeating "i just want to rest"    Vital Signs Last 24 Hrs  T(C): 36.4 (03 Oct 2023 05:28), Max: 37.4 (02 Oct 2023 20:05)  T(F): 97.5 (03 Oct 2023 05:28), Max: 99.4 (02 Oct 2023 20:05)  HR: 65 (03 Oct 2023 05:28) (65 - 65)  BP: 135/74 (03 Oct 2023 05:28) (135/74 - 135/74)  BP(mean): --  RR: 18 (03 Oct 2023 05:28) (18 - 18)  SpO2: 99% (03 Oct 2023 05:28) (99% - 99%)    Parameters below as of 03 Oct 2023 05:28  Patient On (Oxygen Delivery Method): nasal cannula  O2 Flow (L/min): 2      ________________________________________________  PHYSICAL EXAM:  GENERAL: NAD  HEENT: Normocephalic;  NECK : supple  CHEST/LUNG: Clear to auscultation bilaterally  HEART: S1 S2  regular  ABDOMEN: Soft, Nontender, Nondistended; Bowel sounds present  EXTREMITIES: no edema  SKIN: warm and dry; no rash  NERVOUS SYSTEM:  Awake and alert; Oriented  to person     _________________________________________________  LABS:                        8.9    4.13  )-----------( 79       ( 02 Oct 2023 16:14 )             27.7     10-02    140  |  110<H>  |  18  ----------------------------<  86  3.5   |  26  |  0.70    Ca    7.7<L>      02 Oct 2023 06:03  Phos  1.8     10-02  Mg     2.0     10-02        Urinalysis Basic - ( 02 Oct 2023 06:03 )    Color: x / Appearance: x / SG: x / pH: x  Gluc: 86 mg/dL / Ketone: x  / Bili: x / Urobili: x   Blood: x / Protein: x / Nitrite: x   Leuk Esterase: x / RBC: x / WBC x   Sq Epi: x / Non Sq Epi: x / Bacteria: x      CAPILLARY BLOOD GLUCOSE            RADIOLOGY & ADDITIONAL TESTS:    < from: US Duplex Carotid Arteries Complete, Bilateral (10.01.23 @ 10:46) >  IMPRESSION: Severe atherosclerotic plaque at both carotid bulbs.    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  Severe stenosis in the   proximal left internal carotid artery measuring greater than 70% using   NASCET criteria.    There is low velocity flow in the proximal left vertebral artery which   may be indicative of underlying atherosclerotic disease.    Provider Migdalia Aguilar NP was notified of the findings by the   ultrasound technologist Prince Bryan at the time of ultrasound   examination.  I notified Dr. Rashid and Dr. Hernandez via secure message on   TEAMS on 10/01/2023 at 1:31 PM.    Measurement of carotid stenosis is based on velocity parameters that   correlate the residual internal carotid diameter withthat of the more   distal vessel in accordance with a method such as the North American   Symptomatic Carotid Endarterectomy Trial (NASCET).    --- End of Report ---    < end of copied text >    < from: CT Neck Soft Tissue No Cont (10.01.23 @ 10:12) >  IMPRESSION:  No evidence of neck mass, fluid collection/abscess, soft tissue edema,   soft tissue gas or foreign body, within limits of noncontrast CT technique    The visualized lungs are motion degraded.  There are patchy groundglass   and tree-in-bud nodular opacities in both upper lobes.  The findings may   be secondary to aspiration.  Bronchopneumonia, atypical lung infections   and other causes of inflammatory small airways disease are in the   differential diagnosis.    Adherent secretions along the right lateral wall the upper trachea.    Severe atherosclerotic calcification at both carotid bifurcations.  The   findings correspond to right internal carotid artery stenosis and severe   stenosis left internal carotid artery on the same day carotid duplex   ultrasound.    Near complete opacification of the right mastoid air cells.  Correlate   clinically for sterile effusion versus acute otitis media.    --- End of Report ---    < end of copied text >    Imaging Personally Reviewed:  YES    Consultant(s) Notes Reviewed:   YES/      Plan of care was discussed with patient and /or primary care giver; all questions and concerns were addressed and care was aligned with patient's wishes.

## 2023-10-03 NOTE — PROGRESS NOTE ADULT - ASSESSMENT
88yo M with aspiration pneumonia and severe carotid artery stenosis found to have hematuria. Patient required transfusion yesterday but urine is currently clear yellow vs light pink so low clinical suspicion for an active, brisk bleed. Hematuria is likley 2/2 BPH given that patient has significant obstructive symptoms but could also be 2/2 to malignancy     Recommendations  -Flomax 0.4mg qHS   -Finasteride 5mg qDay  -Bowel regimen, senna, colace, miralax  -Continue boyce catheter, discharge with boyce catheter given elevated PVR; catheter irrigated with return of minimal clot  - CT urogram to assess for malignancy in  tract, can be done inpatient or outpatient  - Cystoscopy outpatient to check for BPH vs bladder tumor  - Not concerned for active bleed, no indication for CBI at this time  - Patients urine continues to be clear and H/H stable, urology to sign off. Please reconsult as needed  - Discussed with Dr. Mcneal

## 2023-10-03 NOTE — PROGRESS NOTE ADULT - PROBLEM SELECTOR PLAN 11
d/c lovenox in setting of hematuria, continue SCD

## 2023-10-03 NOTE — DIETITIAN INITIAL EVALUATION ADULT - PERTINENT MEDS FT
MEDICATIONS  (STANDING):  amLODIPine   Tablet 5 milliGRAM(s) Oral daily  aspirin enteric coated 81 milliGRAM(s) Oral daily  chlorhexidine 2% Cloths 1 Application(s) Topical <User Schedule>  finasteride 5 milliGRAM(s) Oral daily  FLUoxetine 10 milliGRAM(s) Oral daily  folic acid 1 milliGRAM(s) Oral daily  lisinopril 10 milliGRAM(s) Oral daily  LORazepam     Tablet 1 milliGRAM(s) Oral at bedtime  multivitamin 1 Tablet(s) Oral daily  piperacillin/tazobactam IVPB.. 3.375 Gram(s) IV Intermittent every 8 hours  polyethylene glycol 3350 17 Gram(s) Oral daily  senna 2 Tablet(s) Oral at bedtime  tamsulosin 0.4 milliGRAM(s) Oral at bedtime    MEDICATIONS  (PRN):  acetaminophen     Tablet .. 650 milliGRAM(s) Oral every 6 hours PRN Temp greater or equal to 38C (100.4F), Mild Pain (1 - 3)  guaiFENesin Oral Liquid (Sugar-Free) 100 milliGRAM(s) Oral every 6 hours PRN Cough  nystatin Powder 1 Application(s) Topical three times a day PRN skin care  ondansetron Injectable 4 milliGRAM(s) IV Push every 8 hours PRN Nausea and/or Vomiting

## 2023-10-03 NOTE — PROGRESS NOTE ADULT - PROBLEM SELECTOR PLAN 12
-patient from [facility]  PT reccs BETO  f/u blood cx
-patient from [facility]  PT reccs BETO  - BCX NGTD  - f/u ID reccs for final antibiotic plan  - f/u Podiatry reccs  - Vascular surgery reccs: no intervention  - Urology reccs: CT urogram patient son refusing at this time, will pursue further workup outpatient
-patient from [facility]  PT reccs BETO  - BCX NGTD  - f/u ID reccs for final antibiotic plan  - f/u Podiatry reccs  - Vascular surgery reccs: no intervention  - Urology reccs: CT urogram patient son refusing at this time, will pursue further workup outpatient
-patient from [facility]  PT reccs BETO  f/u blood cx

## 2023-10-03 NOTE — PROGRESS NOTE ADULT - PROBLEM SELECTOR PLAN 1
- on admission required NRB, aggressive suctioning which removed food chunks and copious secretions  - CXr + LLL consolidations  - Speech and swallow recommending minced and moist diet  - continue IV Zosyn  -aspiration precautions  - BCX and UCX NGTD  -ID Dr. Zaman

## 2023-10-03 NOTE — CONSULT NOTE ADULT - CONSULT REASON
carotid artery stenosis
R hallux wound
Aspiration pneumonia
Acute hypoxic resp failure / ? asp pna
gross hematuria

## 2023-10-03 NOTE — DIETITIAN INITIAL EVALUATION ADULT - PROBLEM SELECTOR PLAN 1
On admission was unconscious and req NRB, after aggressive suctioning which removed food chunks and copious secretions  2/2 aspiration, and pt had recently been admitted to [facility] for aspiration pneumonia  CXR wet read - No consolidations  Not meeting sepsis criteria  s/p vanc + zosyn in ED    -given prior hx of aspiration pneumonia, very likely that pt will continue to have aspiration events  -elevate head of bed to 60-90 deg, do not lay patient flat  -NPO except meds pending s/s eval  -zosyn for now given hx of recent abx use, hospitalization, and stay in [facility]  -f/u bcx, ucx  -ID Dr. Zaman

## 2023-10-03 NOTE — DIETITIAN INITIAL EVALUATION ADULT - NUTRITION DIAGNOSTIC #1 OTHER
suspected inadequate oral intake related to advanced age w/ AMS and chew/ swallow issue as evidenced bt PO supplement PTA, Disc w/ RN, temporal wasting (mild)

## 2023-10-03 NOTE — PROGRESS NOTE ADULT - SUBJECTIVE AND OBJECTIVE BOX
Patient is seen and examined at the bed side, is afebrile. He mentioned feeling okay, the H/h is stable.       REVIEW OF SYSTEMS: All other review systems are negative      ALLERGIES: No Known Allergies      Vital Signs Last 24 Hrs  T(C): 36.8 (03 Oct 2023 14:18), Max: 37.4 (02 Oct 2023 20:05)  T(F): 98.3 (03 Oct 2023 14:18), Max: 99.4 (02 Oct 2023 20:05)  HR: 66 (03 Oct 2023 14:18) (65 - 66)  BP: 146/84 (03 Oct 2023 14:18) (135/74 - 146/84)  BP(mean): --  RR: 18 (03 Oct 2023 14:18) (18 - 18)  SpO2: 97% (03 Oct 2023 14:18) (97% - 99%)    Parameters below as of 03 Oct 2023 14:18  Patient On (Oxygen Delivery Method): nasal cannula  O2 Flow (L/min): 2        PHYSICAL EXAM:  GENERAL: Not in distress , on oxygen via NC  CHEST/LUNG: Not using accessory muscles  HEART: s1 and s2 present  ABDOMEN:  Nontender and  Nondistended  EXTREMITIES: No pedal  edema  CNS: Awake and Alert      LABS: No new Labs                         8.9    4.13  )-----------( 79       ( 02 Oct 2023 16:14 )             27.7                           8.1    39.39 )-----------( 104      ( 30 Sep 2023 04:50 )             26.0         10-02    140  |  110<H>  |  18  ----------------------------<  86  3.5   |  26  |  0.70    Ca    7.7<L>      02 Oct 2023 06:03  Phos  1.8     10-02  Mg     2.0     10-02      10-01    142  |  112<H>  |  20<H>  ----------------------------<  86  3.8   |  26  |  0.81    Ca    7.7<L>      01 Oct 2023 06:28  Phos  2.4     10-01  Mg     2.2     10-01    TPro  7.9  /  Alb  3.0<L>  /  TBili  0.4  /  DBili  x   /  AST  37  /  ALT  26  /  AlkPhos  77  09-29    PT/INR - ( 29 Sep 2023 16:15 )   PT: 12.3 sec;   INR: 1.08 ratio    PTT - ( 29 Sep 2023 16:15 )  PTT:38.6 sec      CAPILLARY BLOOD GLUCOSE  POCT Blood Glucose.: 203 mg/dL (29 Sep 2023 16:03)      Procalcitonin, Serum (10.01.23 @ 06:28) : 1.43        MEDICATIONS  (STANDING):    amLODIPine   Tablet 5 milliGRAM(s) Oral daily  aspirin enteric coated 81 milliGRAM(s) Oral daily  chlorhexidine 2% Cloths 1 Application(s) Topical <User Schedule>  finasteride 5 milliGRAM(s) Oral daily  FLUoxetine 10 milliGRAM(s) Oral daily  folic acid 1 milliGRAM(s) Oral daily  lisinopril 10 milliGRAM(s) Oral daily  LORazepam     Tablet 1 milliGRAM(s) Oral at bedtime  multivitamin 1 Tablet(s) Oral daily  piperacillin/tazobactam IVPB.. 3.375 Gram(s) IV Intermittent every 8 hours  polyethylene glycol 3350 17 Gram(s) Oral daily  senna 2 Tablet(s) Oral at bedtime  tamsulosin 0.4 milliGRAM(s) Oral at bedtime      RADIOLOGY & ADDITIONAL TESTS:    9/29/23: Xray Chest 1 View- PORTABLE-Urgent (Xray Chest 1 View- PORTABLE-Urgent .) (09.29.23 @ 16:24) >    IMPRESSION: Left lower lobe infiltrate/atelectasis/effusion for which  follow-up radiographs are suggested.      MICROBIOLOGY DATA:      Culture - Blood (09.29.23 @ 16:30)   Specimen Source: .Blood Blood-Peripheral  Culture Results: No growth at 72 hours    Culture - Blood (09.29.23 @ 16:15)   Specimen Source: .Blood Blood-Peripheral  Culture Results: No growth at 72 hours    Respiratory Viral Panel with COVID-19 by RILEY (09.29.23 @ 16:15)   Rapid RVP Result: Detected  SARS-CoV-2: NotDetec: This                                            Patient is seen and examined at the bed side, is afebrile. He mentioned feeling okay, The Urology follow up noted.       REVIEW OF SYSTEMS: All other review systems are negative      ALLERGIES: No Known Allergies      Vital Signs Last 24 Hrs  T(C): 36.8 (03 Oct 2023 14:18), Max: 37.4 (02 Oct 2023 20:05)  T(F): 98.3 (03 Oct 2023 14:18), Max: 99.4 (02 Oct 2023 20:05)  HR: 66 (03 Oct 2023 14:18) (65 - 66)  BP: 146/84 (03 Oct 2023 14:18) (135/74 - 146/84)  BP(mean): --  RR: 18 (03 Oct 2023 14:18) (18 - 18)  SpO2: 97% (03 Oct 2023 14:18) (97% - 99%)    Parameters below as of 03 Oct 2023 14:18  Patient On (Oxygen Delivery Method): nasal cannula  O2 Flow (L/min): 2        PHYSICAL EXAM:  GENERAL: Not in distress , on oxygen via NC  CHEST/LUNG: Not using accessory muscles  HEART: s1 and s2 present  ABDOMEN:  Nontender and  Nondistended  EXTREMITIES: No pedal  edema  CNS: Awake and Alert      LABS: No new Labs                         8.9    4.13  )-----------( 79       ( 02 Oct 2023 16:14 )             27.7                           8.1    39.39 )-----------( 104      ( 30 Sep 2023 04:50 )             26.0         10-02    140  |  110<H>  |  18  ----------------------------<  86  3.5   |  26  |  0.70    Ca    7.7<L>      02 Oct 2023 06:03  Phos  1.8     10-02  Mg     2.0     10-02      10-01    142  |  112<H>  |  20<H>  ----------------------------<  86  3.8   |  26  |  0.81    Ca    7.7<L>      01 Oct 2023 06:28  Phos  2.4     10-01  Mg     2.2     10-01    TPro  7.9  /  Alb  3.0<L>  /  TBili  0.4  /  DBili  x   /  AST  37  /  ALT  26  /  AlkPhos  77  09-29    PT/INR - ( 29 Sep 2023 16:15 )   PT: 12.3 sec;   INR: 1.08 ratio    PTT - ( 29 Sep 2023 16:15 )  PTT:38.6 sec      CAPILLARY BLOOD GLUCOSE  POCT Blood Glucose.: 203 mg/dL (29 Sep 2023 16:03)      Procalcitonin, Serum (10.01.23 @ 06:28) : 1.43        MEDICATIONS  (STANDING):    amLODIPine   Tablet 5 milliGRAM(s) Oral daily  aspirin enteric coated 81 milliGRAM(s) Oral daily  chlorhexidine 2% Cloths 1 Application(s) Topical <User Schedule>  finasteride 5 milliGRAM(s) Oral daily  FLUoxetine 10 milliGRAM(s) Oral daily  folic acid 1 milliGRAM(s) Oral daily  lisinopril 10 milliGRAM(s) Oral daily  LORazepam     Tablet 1 milliGRAM(s) Oral at bedtime  multivitamin 1 Tablet(s) Oral daily  piperacillin/tazobactam IVPB.. 3.375 Gram(s) IV Intermittent every 8 hours  polyethylene glycol 3350 17 Gram(s) Oral daily  senna 2 Tablet(s) Oral at bedtime  tamsulosin 0.4 milliGRAM(s) Oral at bedtime      RADIOLOGY & ADDITIONAL TESTS:    9/29/23: Xray Chest 1 View- PORTABLE-Urgent (Xray Chest 1 View- PORTABLE-Urgent .) (09.29.23 @ 16:24) >    IMPRESSION: Left lower lobe infiltrate/atelectasis/effusion for which  follow-up radiographs are suggested.      MICROBIOLOGY DATA:      Culture - Blood (09.29.23 @ 16:30)   Specimen Source: .Blood Blood-Peripheral  Culture Results: No growth at 4 days    Culture - Blood (09.29.23 @ 16:15)   Specimen Source: .Blood Blood-Peripheral  Culture Results: No growth at 4 days    Respiratory Viral Panel with COVID-19 by RILEY (09.29.23 @ 16:15)   Rapid RVP Result: Detected  SARS-CoV-2: NotDetec: This

## 2023-10-03 NOTE — CONSULT NOTE ADULT - ASSESSMENT
A: Paronychia, left hallux      P:  pt evaluated and chart reviewed  Vitals reviewed, no leukocytosis  Obtained wound culture from right hallux  Applied betadine, DSD to right hallux nail  Pt to keep dressing dry, clean and intact  Discussed importance of daily foot examinations and proper shoe gear and to importance of lower Fasting Blood Glucose levels. Strict glycemic control for optimal wound healing.  Stable from podiatry standpoint  Discussed with Dr. Shelley   A: Paronychia, left hallux      P:  pt evaluated and chart reviewed  Vitals reviewed, no leukocytosis  Obtained wound culture from right hallux. F/u culture, recommend abx for now  Applied betadine, DSD to right hallux nail  Pt to keep dressing dry, clean and intact  Discussed importance of daily foot examinations and proper shoe gear and to importance of lower Fasting Blood Glucose levels. Strict glycemic control for optimal wound healing.  Stable from podiatry standpoint  Discussed with Dr. Shelley

## 2023-10-04 ENCOUNTER — TRANSCRIPTION ENCOUNTER (OUTPATIENT)
Age: 88
End: 2023-10-04

## 2023-10-04 VITALS
HEART RATE: 67 BPM | TEMPERATURE: 98 F | RESPIRATION RATE: 18 BRPM | SYSTOLIC BLOOD PRESSURE: 134 MMHG | DIASTOLIC BLOOD PRESSURE: 74 MMHG | OXYGEN SATURATION: 94 %

## 2023-10-04 LAB
ANION GAP SERPL CALC-SCNC: 3 MMOL/L — LOW (ref 5–17)
BUN SERPL-MCNC: 14 MG/DL — SIGNIFICANT CHANGE UP (ref 7–18)
CALCIUM SERPL-MCNC: 8.2 MG/DL — LOW (ref 8.4–10.5)
CHLORIDE SERPL-SCNC: 105 MMOL/L — SIGNIFICANT CHANGE UP (ref 96–108)
CO2 SERPL-SCNC: 29 MMOL/L — SIGNIFICANT CHANGE UP (ref 22–31)
CREAT SERPL-MCNC: 0.82 MG/DL — SIGNIFICANT CHANGE UP (ref 0.5–1.3)
CRP SERPL-MCNC: 34 MG/L — HIGH
CULTURE RESULTS: SIGNIFICANT CHANGE UP
CULTURE RESULTS: SIGNIFICANT CHANGE UP
EGFR: 85 ML/MIN/1.73M2 — SIGNIFICANT CHANGE UP
ERYTHROCYTE [SEDIMENTATION RATE] IN BLOOD: 91 MM/HR — HIGH (ref 0–20)
GLUCOSE SERPL-MCNC: 96 MG/DL — SIGNIFICANT CHANGE UP (ref 70–99)
HCT VFR BLD CALC: 27.7 % — LOW (ref 39–50)
HGB BLD-MCNC: 8.8 G/DL — LOW (ref 13–17)
MCHC RBC-ENTMCNC: 31.8 GM/DL — LOW (ref 32–36)
MCHC RBC-ENTMCNC: 35.3 PG — HIGH (ref 27–34)
MCV RBC AUTO: 111.2 FL — HIGH (ref 80–100)
NRBC # BLD: 0 /100 WBCS — SIGNIFICANT CHANGE UP (ref 0–0)
PLATELET # BLD AUTO: 74 K/UL — LOW (ref 150–400)
POTASSIUM SERPL-MCNC: 3.8 MMOL/L — SIGNIFICANT CHANGE UP (ref 3.5–5.3)
POTASSIUM SERPL-SCNC: 3.8 MMOL/L — SIGNIFICANT CHANGE UP (ref 3.5–5.3)
RBC # BLD: 2.49 M/UL — LOW (ref 4.2–5.8)
RBC # FLD: 21.3 % — HIGH (ref 10.3–14.5)
SODIUM SERPL-SCNC: 137 MMOL/L — SIGNIFICANT CHANGE UP (ref 135–145)
SPECIMEN SOURCE: SIGNIFICANT CHANGE UP
SPECIMEN SOURCE: SIGNIFICANT CHANGE UP
WBC # BLD: 3.84 K/UL — SIGNIFICANT CHANGE UP (ref 3.8–10.5)
WBC # FLD AUTO: 3.84 K/UL — SIGNIFICANT CHANGE UP (ref 3.8–10.5)

## 2023-10-04 PROCEDURE — 80053 COMPREHEN METABOLIC PANEL: CPT

## 2023-10-04 PROCEDURE — 80048 BASIC METABOLIC PNL TOTAL CA: CPT

## 2023-10-04 PROCEDURE — 96374 THER/PROPH/DIAG INJ IV PUSH: CPT

## 2023-10-04 PROCEDURE — 86140 C-REACTIVE PROTEIN: CPT

## 2023-10-04 PROCEDURE — 82962 GLUCOSE BLOOD TEST: CPT

## 2023-10-04 PROCEDURE — 82803 BLOOD GASES ANY COMBINATION: CPT

## 2023-10-04 PROCEDURE — 84145 PROCALCITONIN (PCT): CPT

## 2023-10-04 PROCEDURE — 80061 LIPID PANEL: CPT

## 2023-10-04 PROCEDURE — 84295 ASSAY OF SERUM SODIUM: CPT

## 2023-10-04 PROCEDURE — 87040 BLOOD CULTURE FOR BACTERIA: CPT

## 2023-10-04 PROCEDURE — 83605 ASSAY OF LACTIC ACID: CPT

## 2023-10-04 PROCEDURE — 83735 ASSAY OF MAGNESIUM: CPT

## 2023-10-04 PROCEDURE — 84100 ASSAY OF PHOSPHORUS: CPT

## 2023-10-04 PROCEDURE — 93880 EXTRACRANIAL BILAT STUDY: CPT

## 2023-10-04 PROCEDURE — 92610 EVALUATE SWALLOWING FUNCTION: CPT

## 2023-10-04 PROCEDURE — 84132 ASSAY OF SERUM POTASSIUM: CPT

## 2023-10-04 PROCEDURE — 96361 HYDRATE IV INFUSION ADD-ON: CPT

## 2023-10-04 PROCEDURE — 70490 CT SOFT TISSUE NECK W/O DYE: CPT

## 2023-10-04 PROCEDURE — 86850 RBC ANTIBODY SCREEN: CPT

## 2023-10-04 PROCEDURE — 93970 EXTREMITY STUDY: CPT

## 2023-10-04 PROCEDURE — 97162 PT EVAL MOD COMPLEX 30 MIN: CPT

## 2023-10-04 PROCEDURE — 85652 RBC SED RATE AUTOMATED: CPT

## 2023-10-04 PROCEDURE — 83880 ASSAY OF NATRIURETIC PEPTIDE: CPT

## 2023-10-04 PROCEDURE — P9040: CPT

## 2023-10-04 PROCEDURE — 85027 COMPLETE CBC AUTOMATED: CPT

## 2023-10-04 PROCEDURE — 86901 BLOOD TYPING SEROLOGIC RH(D): CPT

## 2023-10-04 PROCEDURE — 81001 URINALYSIS AUTO W/SCOPE: CPT

## 2023-10-04 PROCEDURE — 87070 CULTURE OTHR SPECIMN AEROBIC: CPT

## 2023-10-04 PROCEDURE — 99285 EMERGENCY DEPT VISIT HI MDM: CPT

## 2023-10-04 PROCEDURE — 86900 BLOOD TYPING SEROLOGIC ABO: CPT

## 2023-10-04 PROCEDURE — 36415 COLL VENOUS BLD VENIPUNCTURE: CPT

## 2023-10-04 PROCEDURE — 84484 ASSAY OF TROPONIN QUANT: CPT

## 2023-10-04 PROCEDURE — 94640 AIRWAY INHALATION TREATMENT: CPT

## 2023-10-04 PROCEDURE — 85025 COMPLETE CBC W/AUTO DIFF WBC: CPT

## 2023-10-04 PROCEDURE — 71045 X-RAY EXAM CHEST 1 VIEW: CPT

## 2023-10-04 PROCEDURE — 85730 THROMBOPLASTIN TIME PARTIAL: CPT

## 2023-10-04 PROCEDURE — 82330 ASSAY OF CALCIUM: CPT

## 2023-10-04 PROCEDURE — 93005 ELECTROCARDIOGRAM TRACING: CPT

## 2023-10-04 PROCEDURE — 87077 CULTURE AEROBIC IDENTIFY: CPT

## 2023-10-04 PROCEDURE — 36430 TRANSFUSION BLD/BLD COMPNT: CPT

## 2023-10-04 PROCEDURE — 97530 THERAPEUTIC ACTIVITIES: CPT

## 2023-10-04 PROCEDURE — 97110 THERAPEUTIC EXERCISES: CPT

## 2023-10-04 PROCEDURE — 85610 PROTHROMBIN TIME: CPT

## 2023-10-04 PROCEDURE — 87086 URINE CULTURE/COLONY COUNT: CPT

## 2023-10-04 PROCEDURE — 96375 TX/PRO/DX INJ NEW DRUG ADDON: CPT

## 2023-10-04 PROCEDURE — 99222 1ST HOSP IP/OBS MODERATE 55: CPT

## 2023-10-04 PROCEDURE — 87186 SC STD MICRODIL/AGAR DIL: CPT

## 2023-10-04 PROCEDURE — 86923 COMPATIBILITY TEST ELECTRIC: CPT

## 2023-10-04 PROCEDURE — 0225U NFCT DS DNA&RNA 21 SARSCOV2: CPT

## 2023-10-04 RX ORDER — IRON SUCROSE 20 MG/ML
200 INJECTION, SOLUTION INTRAVENOUS
Refills: 0 | DISCHARGE

## 2023-10-04 RX ORDER — FINASTERIDE 5 MG/1
1 TABLET, FILM COATED ORAL
Qty: 30 | Refills: 0
Start: 2023-10-04 | End: 2023-11-02

## 2023-10-04 RX ORDER — SENNA PLUS 8.6 MG/1
2 TABLET ORAL
Qty: 60 | Refills: 0
Start: 2023-10-04 | End: 2023-11-02

## 2023-10-04 RX ORDER — POLYETHYLENE GLYCOL 3350 17 G/17G
17 POWDER, FOR SOLUTION ORAL
Qty: 510 | Refills: 0
Start: 2023-10-04 | End: 2023-11-02

## 2023-10-04 RX ADMIN — CHLORHEXIDINE GLUCONATE 1 APPLICATION(S): 213 SOLUTION TOPICAL at 05:30

## 2023-10-04 RX ADMIN — Medication 10 MILLIGRAM(S): at 12:51

## 2023-10-04 RX ADMIN — Medication 1 TABLET(S): at 12:38

## 2023-10-04 RX ADMIN — Medication 1 MILLIGRAM(S): at 12:39

## 2023-10-04 RX ADMIN — PIPERACILLIN AND TAZOBACTAM 25 GRAM(S): 4; .5 INJECTION, POWDER, LYOPHILIZED, FOR SOLUTION INTRAVENOUS at 14:45

## 2023-10-04 RX ADMIN — PIPERACILLIN AND TAZOBACTAM 25 GRAM(S): 4; .5 INJECTION, POWDER, LYOPHILIZED, FOR SOLUTION INTRAVENOUS at 05:27

## 2023-10-04 RX ADMIN — NYSTATIN CREAM 1 APPLICATION(S): 100000 CREAM TOPICAL at 12:39

## 2023-10-04 RX ADMIN — FINASTERIDE 5 MILLIGRAM(S): 5 TABLET, FILM COATED ORAL at 12:39

## 2023-10-04 RX ADMIN — Medication 81 MILLIGRAM(S): at 12:39

## 2023-10-04 RX ADMIN — AMLODIPINE BESYLATE 5 MILLIGRAM(S): 2.5 TABLET ORAL at 05:27

## 2023-10-04 RX ADMIN — POLYETHYLENE GLYCOL 3350 17 GRAM(S): 17 POWDER, FOR SOLUTION ORAL at 14:44

## 2023-10-04 RX ADMIN — LISINOPRIL 10 MILLIGRAM(S): 2.5 TABLET ORAL at 05:27

## 2023-10-04 NOTE — DISCHARGE NOTE PROVIDER - CARE PROVIDER_API CALL
Maverick Hernandez  Internal Medicine  [address] [place] [address]  Phone: [phone]  Fax: [phone]  Follow Up Time: 1 week    Bhupinder Mcneal  Urology  [address] [place] [id number]  Phone: [phone]  Fax: [phone]  Follow Up Time: 2 weeks

## 2023-10-04 NOTE — DISCHARGE NOTE NURSING/CASE MANAGEMENT/SOCIAL WORK - PATIENT PORTAL LINK FT
You can access the FollowMyHealth Patient Portal offered by [facility] by registering at the following website: [email or web address]. By joining [employer]s FollowMyHealth portal, you will also be able to view your health information using other applications (apps) compatible with our system.

## 2023-10-04 NOTE — DISCHARGE NOTE NURSING/CASE MANAGEMENT/SOCIAL WORK - NSDCPEFALRISK_GEN_ALL_CORE
For information on Fall & Injury Prevention, visit: [email or web address] OR  [email or web address] OR  https://www.cdc.gov/steadi/patient.html

## 2023-10-04 NOTE — PROGRESS NOTE ADULT - PROVIDER SPECIALTY LIST ADULT
Infectious Disease
Pulmonology
Urology
Infectious Disease
Pulmonology
Podiatry
Internal Medicine

## 2023-10-04 NOTE — PROGRESS NOTE ADULT - ATTENDING COMMENTS
Left hallux paronychia    Culture pending, recommend soft tissue coverage with abx until the result, outpatient f/u Left hallux paronychia.    Culture pending, recommend soft tissue coverage with abx until the result, ID recs appreciated Augmentin outpatient, outpatient f/u

## 2023-10-04 NOTE — PROGRESS NOTE ADULT - SUBJECTIVE AND OBJECTIVE BOX
Patient is afebrile, H/h is stable. The WBC count and Kidney function stay normal.  The discharge plan noted.      REVIEW OF SYSTEMS: All other review systems are negative      ALLERGIES: No Known Allergies      Vital Signs Last 24 Hrs  T(C): 36.6 (04 Oct 2023 13:31), Max: 36.6 (04 Oct 2023 05:33)  T(F): 97.9 (04 Oct 2023 13:31), Max: 97.9 (04 Oct 2023 13:31)  HR: 67 (04 Oct 2023 13:31) (64 - 67)  BP: 134/74 (04 Oct 2023 13:31) (134/74 - 154/60)  BP(mean): --  RR: 18 (04 Oct 2023 13:31) (18 - 18)  SpO2: 94% (04 Oct 2023 13:31) (94% - 100%)    Parameters below as of 04 Oct 2023 13:31  Patient On (Oxygen Delivery Method): room air        PHYSICAL EXAM:  GENERAL: Not in distress , on oxygen via NC  CHEST/LUNG: Not using accessory muscles  HEART: s1 and s2 present  ABDOMEN:  Nontender and  Nondistended  EXTREMITIES: No pedal  edema  CNS: Awake and Alert      LABS:                           8.8    3.84  )-----------( 74       ( 04 Oct 2023 07:15 )             27.7                           8.1    39.39 )-----------( 104      ( 30 Sep 2023 04:50 )             26.0       10-04    137  |  105  |  14  ----------------------------<  96  3.8   |  29  |  0.82    Ca    8.2<L>      04 Oct 2023 07:15      10-02    140  |  110<H>  |  18  ----------------------------<  86  3.5   |  26  |  0.70    Ca    7.7<L>      02 Oct 2023 06:03  Phos  1.8     10-02  Mg     2.0     10-02    TPro  7.9  /  Alb  3.0<L>  /  TBili  0.4  /  DBili  x   /  AST  37  /  ALT  26  /  AlkPhos  77  09-29    PT/INR - ( 29 Sep 2023 16:15 )   PT: 12.3 sec;   INR: 1.08 ratio    PTT - ( 29 Sep 2023 16:15 )  PTT:38.6 sec      CAPILLARY BLOOD GLUCOSE  POCT Blood Glucose.: 203 mg/dL (29 Sep 2023 16:03)      Procalcitonin, Serum (10.01.23 @ 06:28) : 1.43        MEDICATIONS  (STANDING):    amLODIPine   Tablet 5 milliGRAM(s) Oral daily  aspirin enteric coated 81 milliGRAM(s) Oral daily  chlorhexidine 2% Cloths 1 Application(s) Topical <User Schedule>  finasteride 5 milliGRAM(s) Oral daily  FLUoxetine 10 milliGRAM(s) Oral daily  folic acid 1 milliGRAM(s) Oral daily  lisinopril 10 milliGRAM(s) Oral daily  LORazepam     Tablet 1 milliGRAM(s) Oral at bedtime  multivitamin 1 Tablet(s) Oral daily  piperacillin/tazobactam IVPB.. 3.375 Gram(s) IV Intermittent every 8 hours  polyethylene glycol 3350 17 Gram(s) Oral daily  senna 2 Tablet(s) Oral at bedtime  tamsulosin 0.4 milliGRAM(s) Oral at bedtime        RADIOLOGY & ADDITIONAL TESTS:    9/29/23: Xray Chest 1 View- PORTABLE-Urgent (Xray Chest 1 View- PORTABLE-Urgent .) (09.29.23 @ 16:24) >    IMPRESSION: Left lower lobe infiltrate/atelectasis/effusion for which  follow-up radiographs are suggested.      MICROBIOLOGY DATA:      Culture - Blood (09.29.23 @ 16:30)   Specimen Source: .Blood Blood-Peripheral  Culture Results: No growth at 5 days    Culture - Blood (09.29.23 @ 16:15)   Specimen Source: .Blood Blood-Peripheral  Culture Results: No growth at 5 days    Respiratory Viral Panel with COVID-19 by RILEY (09.29.23 @ 16:15)   Rapid RVP Result: Detected  SARS-CoV-2: NotDetec: This

## 2023-10-04 NOTE — DISCHARGE NOTE PROVIDER - NSFOLLOWUPCLINICS_GEN_ALL_ED_FT
[facility]  [address] [place] [address]  Phone: [phone]  Fax: [phone]  Follow Up Time: Routine

## 2023-10-04 NOTE — DISCHARGE NOTE NURSING/CASE MANAGEMENT/SOCIAL WORK - NSDCVIVACCINE_GEN_ALL_CORE_FT
Tdap; 03-Apr-2014 01:03; Kailee Bill (RN); [id number]; IntraMuscular; Deltoid Left.; 0.5 milliLiter(s);

## 2023-10-04 NOTE — DISCHARGE NOTE PROVIDER - HOSPITAL COURSE
88yo M, from [facility], w carotid artery stenosis, HTN, depression, HLD, hx TIA/CVA, on home O2 2L NC. Admitted for aspiration pneumonia resulting in AHRF, lactic acidosis, and encephalopathy, also found to be parainfluenza, placed on IV Zosyn. ID Dr. Zaman consulted pt will complete the course of antibiotic on 10/6/2023. Carotid U/S showed sever stenosis of b/l carotid arteries, vascular consulted and no intervention recommended, continue aspirin. Hospital course c/b Hematuria, followed by urology, hematuria resolved, pt was advised to hve outpatient urology follow up for possible cystoscopy.  IV Zosyn was started for aspiration pneumonia, ID consulted, recommend to complete P.O augmentin until 10/6/23. Speech and swallow therapist recommend Minced & Moist Diet and Thin Liquids, no straw, continue aspiration precaution.   Podiatry consulted for Lt Hallux wound, betadine dressing started, continue dressing f/u with podiatry at the facility or outpatient.     Pt is Oxygen saturation stable on NC 2L, hematuria resolved, hemodynamically stable. Thus, discussed with attending, pt is d/c back to [facility].  Please refer to daily progress notes and consult notes for detail hospital course hence this is a only brief summary.

## 2023-10-04 NOTE — PROGRESS NOTE ADULT - REASON FOR ADMISSION
acute hypoxic respiratory failure

## 2023-10-04 NOTE — PROGRESS NOTE ADULT - ASSESSMENT
A: Paronychia, left hallux      P:  pt evaluated and chart reviewed  Vitals reviewed, no leukocytosis  Obtained wound culture from right hallux- pending results  Applied betadine, DSD to right hallux nail  Pt to keep dressing dry, clean and intact  Discussed importance of daily foot examinations and proper shoe gear and to importance of lower Fasting Blood Glucose levels. Strict glycemic control for optimal wound healing.  Stable from podiatry standpoint  Upon discharge [facility] [address] [facility] [address] [phone]  Discussed with Dr. Shelley   A: Paronychia, left hallux      P:  pt evaluated and chart reviewed  Vitals reviewed, no leukocytosis  Obtained wound culture from right hallux- pending results. Recommend 1 week of abx, pt was on zosyn prior.  Applied betadine, DSD to right hallux nail  Pt to keep dressing dry, clean and intact  Discussed importance of daily foot examinations and proper shoe gear and to importance of lower Fasting Blood Glucose levels. Strict glycemic control for optimal wound healing.  Stable from podiatry standpoint  Upon discharge patient to follow up at [address] [place] [address] [phone]  Discussed with Dr. Shelley

## 2023-10-04 NOTE — PROGRESS NOTE ADULT - SUBJECTIVE AND OBJECTIVE BOX
88 y/o male from [facility] acute rehab with past medical history of carotid artery stenosis, HTN, depression, on home 2L NC admitted to medicine with AHRF 2/2 aspiration pneumonia. He presents with right hallux wound. he is unsure how long it has been there for. He states that it gives him some pain. Denies constitutional symptoms.       Patient admits to  (-) Fevers, (-) Chills, (-) Nausea, (-) Vomiting, (-) Shortness of Breath (-) calf pain (-) chest pain     Medications acetaminophen     Tablet .. 650 milliGRAM(s) Oral every 6 hours PRN  amLODIPine   Tablet 5 milliGRAM(s) Oral daily  aspirin enteric coated 81 milliGRAM(s) Oral daily  chlorhexidine 2% Cloths 1 Application(s) Topical <User Schedule>  finasteride 5 milliGRAM(s) Oral daily  FLUoxetine 10 milliGRAM(s) Oral daily  folic acid 1 milliGRAM(s) Oral daily  guaiFENesin Oral Liquid (Sugar-Free) 100 milliGRAM(s) Oral every 6 hours PRN  lisinopril 10 milliGRAM(s) Oral daily  LORazepam     Tablet 1 milliGRAM(s) Oral at bedtime  multivitamin 1 Tablet(s) Oral daily  nystatin Powder 1 Application(s) Topical three times a day PRN  ondansetron Injectable 4 milliGRAM(s) IV Push every 8 hours PRN  piperacillin/tazobactam IVPB.. 3.375 Gram(s) IV Intermittent every 8 hours  polyethylene glycol 3350 17 Gram(s) Oral daily  senna 2 Tablet(s) Oral at bedtime  tamsulosin 0.4 milliGRAM(s) Oral at bedtime    FH,   PMHHigh cholesterol    Depression    Carotid artery stenosis    CVA (cerebrovascular accident)    History of TIAs       PS    Labs                          8.8    3.84  )-----------( 74       ( 04 Oct 2023 07:15 )             27.7      10-04    137  |  105  |  14  ----------------------------<  96  3.8   |  29  |  0.82    Ca    8.2<L>      04 Oct 2023 07:15       Vital Signs Last 24 Hrs  T(C): 36.6 (04 Oct 2023 05:33), Max: 36.8 (03 Oct 2023 14:18)  T(F): 97.8 (04 Oct 2023 05:33), Max: 98.3 (03 Oct 2023 14:18)  HR: 66 (04 Oct 2023 05:33) (64 - 66)  BP: 150/63 (04 Oct 2023 05:33) (146/84 - 154/60)  BP(mean): --  RR: 18 (04 Oct 2023 05:33) (18 - 18)  SpO2: 100% (04 Oct 2023 05:33) (97% - 100%)    Parameters below as of 04 Oct 2023 05:33  Patient On (Oxygen Delivery Method): nasal cannula  O2 Flow (L/min): 2    Sedimentation Rate, Erythrocyte: 91 mm/Hr (10-04-23 @ 07:15)          WBC Count: 3.84 K/uL (10-04-23 @ 07:15)      ROS: Unremarkable outside HPI               LE Focused Exam  Vascular: Pedal pulses palpable b/l. CFT <3 secs x 10. Temp Gradient warm to cool b/l. Pedal hair absent. = erythema to right hallux  Dermatological: Exposed nailbed with removed toenail to the right hallux, other nails normal. upon expression  <1cc purulent drainage noted from medial nailfold. -PTB -malodor  Neurological: Patient is awake, alert and oriented x3. Gross Epicritic sensation is intact   MSK: Mild pain on palpation to right hallux wound. Arch height 2/5 on-WB, joint ROM wnl with no crepitation. Negative alexus sign b/l         88 y/o male from [facility] acute rehab with past medical history of carotid artery stenosis, HTN, depression, on home 2L NC admitted to medicine with AHRF 2/2 aspiration pneumonia. He presents with right hallux wound. he is unsure how long it has been there for. He states that it gives him some pain. Denies constitutional symptoms.       Patient admits to  (-) Fevers, (-) Chills, (-) Nausea, (-) Vomiting, (-) Shortness of Breath (-) calf pain (-) chest pain     Medications acetaminophen     Tablet .. 650 milliGRAM(s) Oral every 6 hours PRN  amLODIPine   Tablet 5 milliGRAM(s) Oral daily  aspirin enteric coated 81 milliGRAM(s) Oral daily  chlorhexidine 2% Cloths 1 Application(s) Topical <User Schedule>  finasteride 5 milliGRAM(s) Oral daily  FLUoxetine 10 milliGRAM(s) Oral daily  folic acid 1 milliGRAM(s) Oral daily  guaiFENesin Oral Liquid (Sugar-Free) 100 milliGRAM(s) Oral every 6 hours PRN  lisinopril 10 milliGRAM(s) Oral daily  LORazepam     Tablet 1 milliGRAM(s) Oral at bedtime  multivitamin 1 Tablet(s) Oral daily  nystatin Powder 1 Application(s) Topical three times a day PRN  ondansetron Injectable 4 milliGRAM(s) IV Push every 8 hours PRN  piperacillin/tazobactam IVPB.. 3.375 Gram(s) IV Intermittent every 8 hours  polyethylene glycol 3350 17 Gram(s) Oral daily  senna 2 Tablet(s) Oral at bedtime  tamsulosin 0.4 milliGRAM(s) Oral at bedtime    FH,   PMHHigh cholesterol    Depression    Carotid artery stenosis    CVA (cerebrovascular accident)    History of TIAs       PS    Labs                          8.8    3.84  )-----------( 74       ( 04 Oct 2023 07:15 )             27.7      10-04    137  |  105  |  14  ----------------------------<  96  3.8   |  29  |  0.82    Ca    8.2<L>      04 Oct 2023 07:15       Vital Signs Last 24 Hrs  T(C): 36.6 (04 Oct 2023 05:33), Max: 36.8 (03 Oct 2023 14:18)  T(F): 97.8 (04 Oct 2023 05:33), Max: 98.3 (03 Oct 2023 14:18)  HR: 66 (04 Oct 2023 05:33) (64 - 66)  BP: 150/63 (04 Oct 2023 05:33) (146/84 - 154/60)  BP(mean): --  RR: 18 (04 Oct 2023 05:33) (18 - 18)  SpO2: 100% (04 Oct 2023 05:33) (97% - 100%)    Parameters below as of 04 Oct 2023 05:33  Patient On (Oxygen Delivery Method): nasal cannula  O2 Flow (L/min): 2    Sedimentation Rate, Erythrocyte: 91 mm/Hr (10-04-23 @ 07:15)          WBC Count: 3.84 K/uL (10-04-23 @ 07:15)      ROS: Unremarkable outside HPI               LE Focused Exam  Vascular: Pedal pulses palpable b/l. CFT <3 secs x 10. Temp Gradient warm to cool b/l. Pedal hair absent. = erythema to right hallux  Dermatological: Exposed nailbed with removed toenail to the right hallux, other nails normal, mild erythema present with no inc in temp. No purulent drainage noted from medial nailfold. -PTB -malodor/ fluctuance/ necrosis/ soft tissue crepitus.  Neurological: Patient is awake, alert and oriented x3. Gross Epicritic sensation is intact   MSK: Mild pain on palpation to right hallux wound. Arch height 2/5 on-WB, joint ROM wnl with no crepitation. Negative alexus sign b/l

## 2023-10-04 NOTE — DISCHARGE NOTE PROVIDER - NSDCCPCAREPLAN_GEN_ALL_CORE_FT
PRINCIPAL DISCHARGE DIAGNOSIS  Diagnosis: Pneumonia, aspiration  Assessment and Plan of Treatment: You came with in respiratory failure with hypoxia likely due to aspiration pneumonia with parainfluenza. Due to these infections your mental status was declined temporarily.   IV Zosyn with high amount of oxygen therapy started for aspiration pneumonia   Infectious disease - continue augmentin q 12hours till 10/6/23  Speech and swallow - Minced & Moist Diet and Thin Liquids, no straw  continue aspiration pnemonia  Pulmonologist - conitnue supplementla oxygen to maintain oxygen saturation >95%  If yo have any issues with swallowing please have speech therapist to exma you again   If you have fever, cough or low oxygen level please come back to ED      SECONDARY DISCHARGE DIAGNOSES  Diagnosis: Hematuria  Assessment and Plan of Treatment: . Hematuria is likley 2/2 BPH given that patient has significant obstructive symptoms but could also be 2/2 to malignancy   Recommendations  -Flomax 0.4mg qHS   -Finasteride 5mg qDay  -Bowel regimen, senna, colace, miralax  -Continue boyce catheter, discharge with boyce catheter given elevated PVR; catheter irrigated with return of minimal clot  - CT urogram to assess for malignancy in  tract, can be done inpatient or outpatient  - Cystoscopy outpatient to check for BPH vs bladder tumor  - Not concerned for active bleed, no indication for CBI at this time  - Patients urine continues to be clear and H/H stable, please follow up with urology as outpatient       Diagnosis: Acute respiratory failure with hypoxia  Assessment and Plan of Treatment: resolved   see the plan as above    Diagnosis: Carotid artery stenosis  Assessment and Plan of Treatment:     Diagnosis: Parainfluenza  Assessment and Plan of Treatment: see the plan as above  no need isolation    Diagnosis: Lactic acid acidosis  Assessment and Plan of Treatment: resolved    Diagnosis: Metabolic encephalopathy  Assessment and Plan of Treatment: resolved    Diagnosis: Wound, open, toe  Assessment and Plan of Treatment: Lt Hallux wound- seen by podiatry, continue betadine dressing daily   please follow up with podaitry at the facility or out patient     PRINCIPAL DISCHARGE DIAGNOSIS  Diagnosis: Pneumonia, aspiration  Assessment and Plan of Treatment: You came with in respiratory failure with hypoxia likely due to aspiration pneumonia with parainfluenza. Due to these infections your mental status was declined temporarily.   IV Zosyn with high amount of oxygen therapy started for aspiration pneumonia   Infectious disease - continue augmentin q 12hours till 10/6/23  Speech and swallow - Minced & Moist Diet and Thin Liquids, no straw  continue aspiration pnemonia  Pulmonologist - conitnue supplementla oxygen to maintain oxygen saturation >95%  If yo have any issues with swallowing please have speech therapist to exma you again   If you have fever, cough or low oxygen level please come back to ED      SECONDARY DISCHARGE DIAGNOSES  Diagnosis: Acute respiratory failure with hypoxia  Assessment and Plan of Treatment: resolved   see the plan as above    Diagnosis: Metabolic encephalopathy  Assessment and Plan of Treatment: resolved    Diagnosis: Lactic acid acidosis  Assessment and Plan of Treatment: resolved    Diagnosis: Carotid artery stenosis  Assessment and Plan of Treatment: continue home medication and follow up with facility MD after discharge.  -carotid doppler: severe b/l carotid artery stenosis  Evaluated by vascular and recommends  no acute intervention  Follow up outpatient vascular team as well.       Diagnosis: Wound, open, toe  Assessment and Plan of Treatment: Lt Hallux wound- seen by podiatry, continue betadine dressing daily   please follow up with podaitry at the facility or out patient    Diagnosis: Parainfluenza  Assessment and Plan of Treatment: see the plan as above  no need isolation    Diagnosis: Hematuria  Assessment and Plan of Treatment: . Hematuria is likley 2/2 BPH given that patient has significant obstructive symptoms but could also be 2/2 to malignancy. Your urine remains clear, hemoglobin level is stable  Recommendations  -Flomax 0.4mg qHS   -Finasteride 5mg qDay  -Bowel regimen, senna, colace, miralax  -Continue boyce catheter, discharge with boyce catheter given elevated PVR; catheter irrigated with return of minimal clot  - CT urogram to assess for malignancy in  tract, can be done inpatient or outpatient  - Cystoscopy outpatient to check for BPH vs bladder tumor  - Not concerned for active bleed, no indication for CBI at this time  Follow up with urology as outpatient in 1-2 weeks after discharge.       Diagnosis: Anemia  Assessment and Plan of Treatment: You received one unit blood transfusion in the setting with blood in urine. Now hemoglobin level stable 8.8. Platelet 74K.   Symptoms to report, bleeding, palpitations, fatigue, pale skin, cold skin, dizziness. Follow up with facility MD to monitor anemia.

## 2023-10-04 NOTE — PROGRESS NOTE ADULT - ASSESSMENT
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 Pt is a very poor narrator, collateral obtained from chart review. As per chart review, EMS stated when they arrived at the scene patient appeared tachypneic to 30s, was already on nonrebreather, and unconscious, when pt arrived to ER he had been on NRB for approx 1 hr and was awake and able to say his name. ED spoke with the pt's son who is HCP at cell #[phone] -states few weeks ago patient was admitted at [facility] for pneumonia.  Patient reportedly started Vicon for 4 weeks and became very weak was unable to walk.  Patient was discharged to subacute rehab at that time. Son saw the patient day prior and he was answering all questions appropriately.  Did not have any complaints at that time. ON admission, he found to have no fever, but after patient was suctioned and found to have copious mucus and food chunks in airway, once he was suctioned, he was back on 2L NC which is his baseline. He has started on  Zosyn and the ID consult requested to assist with further evaluation and antibiotic management.    # Aspiration pneumonitis- s/p suctioned copious mucus and food chunks from airway in ER  # Parainfluenza viral pneumonia  # Leukocytosis- resolved  # Hematuria- dropped H/H and s/p Transfused 1 U PRBC    would recommend:    1. Continue Zosyn while inpatient, may change to oral Augmentin 875 mg [id number] on discharge to continue until 10/6/23  2. Continue Supportive care for Parainfluenza   3. Supplemental oxygenation and Bronchodilator as needed  4. Isolation as per Protocol     d/w covering Gayathri LY    Attending Attestation:    Spent more than 35 minutes on total encounter, more than 50 % of the visit was spent counseling and/or coordinating care by the Attending physician.

## 2023-10-04 NOTE — DISCHARGE NOTE PROVIDER - PROVIDER TOKENS
PROVIDER:[TOKEN:[[id number]],FOLLOWUP:[1 week]],PROVIDER:[TOKEN:[[id number]],FOLLOWUP:[2 weeks]]

## 2023-10-04 NOTE — DISCHARGE NOTE PROVIDER - NSDCMRMEDTOKEN_GEN_ALL_CORE_FT
amlodipine-benazepril 5 mg-10 mg oral capsule: 1 cap(s) orally once a day  Ativan 1 mg oral tablet: 1 tab(s) orally once a day (at bedtime)  Ecotrin Adult Low Strength 81 mg oral delayed release tablet: 1 tab(s) orally once a day  folic acid 1 mg oral tablet: 1 tab(s) orally once a day  Multiple Vitamins oral tablet: 1 tab(s) orally once a day  Prozac 10 mg oral capsule: 1 cap(s) orally once a day  tamsulosin 0.4 mg oral capsule: 1 cap(s) orally once a day (at bedtime)   amlodipine-benazepril 5 mg-10 mg oral capsule: 1 cap(s) orally once a day  amoxicillin-clavulanate 875 mg-125 mg oral tablet: 875 milligram(s) orally 2 times a day  Ativan 1 mg oral tablet: 1 tab(s) orally once a day (at bedtime)  Ecotrin Adult Low Strength 81 mg oral delayed release tablet: 1 tab(s) orally once a day  finasteride 5 mg oral tablet: 1 tab(s) orally once a day  folic acid 1 mg oral tablet: 1 tab(s) orally once a day  guaiFENesin 100 mg/5 mL oral liquid: 5 milliliter(s) orally every 6 hours as needed for Cough  Multiple Vitamins oral tablet: 1 tab(s) orally once a day  Prozac 10 mg oral capsule: 1 cap(s) orally once a day  senna leaf extract oral tablet: 2 tab(s) orally once a day (at bedtime)  tamsulosin 0.4 mg oral capsule: 1 cap(s) orally once a day (at bedtime)

## 2023-10-05 DIAGNOSIS — L03.032 CELLULITIS OF LEFT TOE: ICD-10-CM

## 2023-10-05 LAB
-  AMPICILLIN/SULBACTAM: SIGNIFICANT CHANGE UP
-  CEFAZOLIN: SIGNIFICANT CHANGE UP
-  CLINDAMYCIN: SIGNIFICANT CHANGE UP
-  ERYTHROMYCIN: SIGNIFICANT CHANGE UP
-  GENTAMICIN: SIGNIFICANT CHANGE UP
-  OXACILLIN: SIGNIFICANT CHANGE UP
-  PENICILLIN: SIGNIFICANT CHANGE UP
-  RIFAMPIN: SIGNIFICANT CHANGE UP
-  TETRACYCLINE: SIGNIFICANT CHANGE UP
-  TRIMETHOPRIM/SULFAMETHOXAZOLE: SIGNIFICANT CHANGE UP
-  VANCOMYCIN: SIGNIFICANT CHANGE UP
METHOD TYPE: SIGNIFICANT CHANGE UP

## 2023-10-08 LAB
CULTURE RESULTS: SIGNIFICANT CHANGE UP
ORGANISM # SPEC MICROSCOPIC CNT: SIGNIFICANT CHANGE UP
ORGANISM # SPEC MICROSCOPIC CNT: SIGNIFICANT CHANGE UP
SPECIMEN SOURCE: SIGNIFICANT CHANGE UP

## 2023-10-13 ENCOUNTER — APPOINTMENT (OUTPATIENT)
Dept: UROLOGY | Facility: CLINIC | Age: 88
End: 2023-10-13
Payer: MEDICARE

## 2023-10-13 ENCOUNTER — APPOINTMENT (OUTPATIENT)
Dept: UROLOGY | Facility: CLINIC | Age: 88
End: 2023-10-13

## 2023-10-13 VITALS
TEMPERATURE: 98 F | SYSTOLIC BLOOD PRESSURE: 114 MMHG | HEART RATE: 82 BPM | HEIGHT: 72 IN | OXYGEN SATURATION: 96 % | BODY MASS INDEX: 23.7 KG/M2 | WEIGHT: 175 LBS | DIASTOLIC BLOOD PRESSURE: 66 MMHG

## 2023-10-13 DIAGNOSIS — Z87.438 PERSONAL HISTORY OF OTHER DISEASES OF MALE GENITAL ORGANS: ICD-10-CM

## 2023-10-13 DIAGNOSIS — Z86.2 PERSONAL HISTORY OF DISEASES OF THE BLOOD AND BLOOD-FORMING ORGANS AND CERTAIN DISORDERS INVOLVING THE IMMUNE MECHANISM: ICD-10-CM

## 2023-10-13 DIAGNOSIS — J18.9 PNEUMONIA, UNSPECIFIED ORGANISM: ICD-10-CM

## 2023-10-13 DIAGNOSIS — Z87.898 PERSONAL HISTORY OF OTHER SPECIFIED CONDITIONS: ICD-10-CM

## 2023-10-13 DIAGNOSIS — Z87.39 PERSONAL HISTORY OF OTHER DISEASES OF THE MUSCULOSKELETAL SYSTEM AND CONNECTIVE TISSUE: ICD-10-CM

## 2023-10-13 DIAGNOSIS — N40.0 BENIGN PROSTATIC HYPERPLASIA WITHOUT LOWER URINARY TRACT SYMPMS: ICD-10-CM

## 2023-10-13 DIAGNOSIS — Z86.73 PERSONAL HISTORY OF TRANSIENT ISCHEMIC ATTACK (TIA), AND CEREBRAL INFARCTION W/OUT RESIDUAL DEFICITS: ICD-10-CM

## 2023-10-13 DIAGNOSIS — R06.82 TACHYPNEA, NOT ELSEWHERE CLASSIFIED: ICD-10-CM

## 2023-10-13 DIAGNOSIS — I11.9 HYPERTENSIVE HEART DISEASE W/OUT HEART FAILURE: ICD-10-CM

## 2023-10-13 DIAGNOSIS — Z91.81 HISTORY OF FALLING: ICD-10-CM

## 2023-10-13 PROBLEM — I63.9 CEREBRAL INFARCTION, UNSPECIFIED: Chronic | Status: ACTIVE | Noted: 2023-09-30

## 2023-10-13 PROCEDURE — 99214 OFFICE O/P EST MOD 30 MIN: CPT

## 2023-10-13 RX ORDER — AMLODIPINE BESYLATE AND BENAZEPRIL HYDROCHLORIDE 5; 10 MG/1; MG/1
5-10 CAPSULE ORAL
Refills: 0 | Status: ACTIVE | COMMUNITY

## 2023-10-13 RX ORDER — LORAZEPAM 1 MG/1
1 TABLET ORAL
Refills: 0 | Status: ACTIVE | COMMUNITY

## 2023-10-13 RX ORDER — POLYETHYLENE GLYCOL 3350
POWDER (GRAM) MISCELLANEOUS
Refills: 0 | Status: ACTIVE | COMMUNITY

## 2023-10-13 RX ORDER — TAMSULOSIN HCL 0.4 MG
0.4 CAPSULE ORAL
Refills: 0 | Status: ACTIVE | COMMUNITY

## 2023-10-13 RX ORDER — FLUOXETINE 10 MG/1
10 TABLET ORAL
Refills: 0 | Status: ACTIVE | COMMUNITY

## 2023-10-13 RX ORDER — MUPIROCIN 20 MG/G
2 OINTMENT TOPICAL
Refills: 0 | Status: ACTIVE | COMMUNITY

## 2023-10-13 RX ORDER — FINASTERIDE 5 MG/1
5 TABLET, FILM COATED ORAL
Refills: 0 | Status: ACTIVE | COMMUNITY

## 2023-10-13 RX ORDER — ASPIRIN 81 MG/1
81 TABLET ORAL
Refills: 0 | Status: ACTIVE | COMMUNITY

## 2023-10-13 RX ORDER — FOLIC ACID 1 MG/1
1 TABLET ORAL
Refills: 0 | Status: ACTIVE | COMMUNITY

## 2023-11-01 ENCOUNTER — TRANSCRIPTION ENCOUNTER (OUTPATIENT)
Age: 88
End: 2023-11-01

## 2023-11-28 ENCOUNTER — NON-APPOINTMENT (OUTPATIENT)
Age: 88
End: 2023-11-28

## 2023-11-29 ENCOUNTER — APPOINTMENT (OUTPATIENT)
Dept: UROLOGY | Facility: CLINIC | Age: 88
End: 2023-11-29
Payer: MEDICARE

## 2023-11-29 VITALS
WEIGHT: 175 LBS | TEMPERATURE: 97.6 F | HEART RATE: 69 BPM | BODY MASS INDEX: 23.7 KG/M2 | OXYGEN SATURATION: 92 % | HEIGHT: 72 IN | RESPIRATION RATE: 16 BRPM

## 2023-11-29 DIAGNOSIS — R31.0 GROSS HEMATURIA: ICD-10-CM

## 2023-11-29 PROCEDURE — 52000 CYSTOURETHROSCOPY: CPT

## 2023-12-04 LAB — URINE CYTOLOGY: NORMAL

## 2023-12-13 NOTE — DISCHARGE NOTE PROVIDER - NSDCQMSTROKE_NEU_ALL_CORE
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  Continue plan as discussed at recent visit.  If inadequate improvement in a few weeks, we will take next steps as discussed.   No